# Patient Record
Sex: FEMALE | Race: WHITE | Employment: STUDENT
[De-identification: names, ages, dates, MRNs, and addresses within clinical notes are randomized per-mention and may not be internally consistent; named-entity substitution may affect disease eponyms.]

---

## 2022-11-07 SDOH — HEALTH STABILITY: PHYSICAL HEALTH: ON AVERAGE, HOW MANY MINUTES DO YOU ENGAGE IN EXERCISE AT THIS LEVEL?: 40 MIN

## 2022-11-07 SDOH — HEALTH STABILITY: PHYSICAL HEALTH: ON AVERAGE, HOW MANY DAYS PER WEEK DO YOU ENGAGE IN MODERATE TO STRENUOUS EXERCISE (LIKE A BRISK WALK)?: 5 DAYS

## 2022-11-08 ENCOUNTER — OFFICE VISIT (OUTPATIENT)
Dept: INTERNAL MEDICINE CLINIC | Facility: CLINIC | Age: 22
End: 2022-11-08
Payer: COMMERCIAL

## 2022-11-08 VITALS
DIASTOLIC BLOOD PRESSURE: 62 MMHG | OXYGEN SATURATION: 98 % | HEIGHT: 67 IN | BODY MASS INDEX: 22.6 KG/M2 | HEART RATE: 60 BPM | SYSTOLIC BLOOD PRESSURE: 110 MMHG | WEIGHT: 144 LBS

## 2022-11-08 DIAGNOSIS — R00.2 PALPITATION: Primary | ICD-10-CM

## 2022-11-08 DIAGNOSIS — R00.2 PALPITATION: ICD-10-CM

## 2022-11-08 DIAGNOSIS — T78.40XA ALLERGY, INITIAL ENCOUNTER: ICD-10-CM

## 2022-11-08 DIAGNOSIS — Z00.00 ENCOUNTER FOR MEDICAL EXAMINATION TO ESTABLISH CARE: ICD-10-CM

## 2022-11-08 DIAGNOSIS — Z91.018 MULTIPLE FOOD ALLERGIES: ICD-10-CM

## 2022-11-08 PROBLEM — E06.3 HASHIMOTO'S THYROIDITIS: Status: ACTIVE | Noted: 2021-07-01

## 2022-11-08 LAB
ANION GAP SERPL CALC-SCNC: 6 MMOL/L (ref 2–11)
BASOPHILS # BLD: 0 K/UL (ref 0–0.2)
BASOPHILS NFR BLD: 1 % (ref 0–2)
BUN SERPL-MCNC: 9 MG/DL (ref 6–23)
CALCIUM SERPL-MCNC: 9.4 MG/DL (ref 8.3–10.4)
CHLORIDE SERPL-SCNC: 109 MMOL/L (ref 101–110)
CO2 SERPL-SCNC: 26 MMOL/L (ref 21–32)
CREAT SERPL-MCNC: 0.6 MG/DL (ref 0.6–1)
DIFFERENTIAL METHOD BLD: NORMAL
EOSINOPHIL # BLD: 0.1 K/UL (ref 0–0.8)
EOSINOPHIL NFR BLD: 2 % (ref 0.5–7.8)
ERYTHROCYTE [DISTWIDTH] IN BLOOD BY AUTOMATED COUNT: 12.7 % (ref 11.9–14.6)
GLUCOSE SERPL-MCNC: 87 MG/DL (ref 65–100)
HCT VFR BLD AUTO: 40.5 % (ref 35.8–46.3)
HGB BLD-MCNC: 13.5 G/DL (ref 11.7–15.4)
IMM GRANULOCYTES # BLD AUTO: 0 K/UL (ref 0–0.5)
IMM GRANULOCYTES NFR BLD AUTO: 0 % (ref 0–5)
LYMPHOCYTES # BLD: 1.6 K/UL (ref 0.5–4.6)
LYMPHOCYTES NFR BLD: 27 % (ref 13–44)
MCH RBC QN AUTO: 28.4 PG (ref 26.1–32.9)
MCHC RBC AUTO-ENTMCNC: 33.3 G/DL (ref 31.4–35)
MCV RBC AUTO: 85.3 FL (ref 82–102)
MONOCYTES # BLD: 0.4 K/UL (ref 0.1–1.3)
MONOCYTES NFR BLD: 7 % (ref 4–12)
NEUTS SEG # BLD: 3.8 K/UL (ref 1.7–8.2)
NEUTS SEG NFR BLD: 63 % (ref 43–78)
NRBC # BLD: 0 K/UL (ref 0–0.2)
PLATELET # BLD AUTO: 205 K/UL (ref 150–450)
PMV BLD AUTO: 11.6 FL (ref 9.4–12.3)
POTASSIUM SERPL-SCNC: 3.5 MMOL/L (ref 3.5–5.1)
RBC # BLD AUTO: 4.75 M/UL (ref 4.05–5.2)
SODIUM SERPL-SCNC: 141 MMOL/L (ref 133–143)
WBC # BLD AUTO: 6 K/UL (ref 4.3–11.1)

## 2022-11-08 PROCEDURE — 99204 OFFICE O/P NEW MOD 45 MIN: CPT | Performed by: INTERNAL MEDICINE

## 2022-11-08 PROCEDURE — 93000 ELECTROCARDIOGRAM COMPLETE: CPT | Performed by: INTERNAL MEDICINE

## 2022-11-08 RX ORDER — EPINEPHRINE 0.3 MG/.3ML
0.3 INJECTION SUBCUTANEOUS ONCE
Qty: 0.3 ML | Refills: 0 | Status: SHIPPED | OUTPATIENT
Start: 2022-11-08 | End: 2022-11-22

## 2022-11-08 RX ORDER — KETOCONAZOLE 20 MG/ML
SHAMPOO TOPICAL
COMMUNITY
Start: 2022-11-02

## 2022-11-08 RX ORDER — LEVOTHYROXINE SODIUM 0.03 MG/1
TABLET ORAL
COMMUNITY
Start: 2022-10-17

## 2022-11-08 RX ORDER — EPINEPHRINE 0.3 MG/.3ML
0.3 INJECTION SUBCUTANEOUS ONCE
Status: DISCONTINUED | OUTPATIENT
Start: 2022-11-08 | End: 2022-11-08

## 2022-11-08 ASSESSMENT — ENCOUNTER SYMPTOMS
NAUSEA: 0
EYE ITCHING: 0
SINUS PRESSURE: 0
HOARSE VOICE: 0
SORE THROAT: 0
SHORTNESS OF BREATH: 0

## 2022-11-08 NOTE — PROGRESS NOTES
FOLLOW UP VISIT    Subjective:    Loreta Virk (: 2000) is a 20 y.o., female,   Chief Complaint   Patient presents with    Establish Care    Allergies     nuts    Other     Hep B vaccines    Palpitations       HPI:  20year old in to establish care. 1. Hypothyroid - She has a hx of thyroid d/o does go to ENDO had a recent TSH check at 10 was started in 25 mcg of synthroid. 2. Dandruff- on nizoril  3. Heart palpitations- does drink enough water, TSH high 10 , states comes and goes not a/w stress, caffiene   4. Multiple food allergies- has not had formal allergy test  5. Vaccine- needs hep b 3rd shot     Allergies  Presents for initial visit. She reports no congestion, eye itching, fever, headaches, hoarse voice, plugged ear sensation, sinus pressure or sore throat. Other  Pertinent negatives include no congestion, fever, headaches, nausea, numbness or sore throat. Palpitations   This is a recurrent problem. The current episode started more than 1 month ago. Pertinent negatives include no anxiety, chest fullness, fever, irregular heartbeat, nausea, numbness or shortness of breath. The following portions of the patient's history were reviewed and updated as appropriate:      No past medical history on file. Past Surgical History:   Procedure Laterality Date    WISDOM TOOTH EXTRACTION         No family history on file. Social History     Socioeconomic History    Marital status: Single     Spouse name: Not on file    Number of children: Not on file    Years of education: Not on file    Highest education level: Not on file   Occupational History    Not on file   Tobacco Use    Smoking status: Never    Smokeless tobacco: Never   Vaping Use    Vaping Use: Never used   Substance and Sexual Activity    Alcohol use: Yes     Comment: Occ.     Drug use: Not on file    Sexual activity: Not on file   Other Topics Concern    Not on file   Social History Narrative    Not on file     Social Determinants of Health     Financial Resource Strain: Not on file   Food Insecurity: Not on file   Transportation Needs: Not on file   Physical Activity: Sufficiently Active    Days of Exercise per Week: 5 days    Minutes of Exercise per Session: 40 min   Stress: Not on file   Social Connections: Not on file   Intimate Partner Violence: Not At Risk    Fear of Current or Ex-Partner: No    Emotionally Abused: No    Physically Abused: No    Sexually Abused: No   Housing Stability: Not on file       Current Outpatient Medications   Medication Sig Dispense Refill    Multiple Vitamin (MULTI-VITAMIN PO)       ketoconazole (NIZORAL) 2 % shampoo       levothyroxine (SYNTHROID) 25 MCG tablet        Current Facility-Administered Medications   Medication Dose Route Frequency Provider Last Rate Last Admin    EPINEPHrine (EPIPEN) 0.3 MG/0.3ML injection 0.3 mg  0.3 mg IntraMUSCular Once Lydia Palacios MD           Allergies as of 11/08/2022 - Fully Reviewed 11/08/2022   Allergen Reaction Noted    Other  11/08/2022    Pecan nut (diagnostic) Other (See Comments) 01/01/2022    Sesame seed (diagnostic) Cough 10/17/2022       Review of Systems   Constitutional:  Negative for fever. HENT:  Negative for congestion, hoarse voice, sinus pressure and sore throat. Eyes:  Negative for itching. Respiratory:  Negative for shortness of breath. Cardiovascular:  Positive for palpitations. Gastrointestinal:  Negative for nausea. Neurological:  Negative for numbness and headaches. Psychiatric/Behavioral:  The patient is not nervous/anxious. Objective:    Blood pressure 110/62, pulse 60, height 5' 7\" (1.702 m), weight 144 lb (65.3 kg), last menstrual period 11/07/2022, SpO2 98 %. Physical Exam  Vitals and nursing note reviewed. Constitutional:       Appearance: Normal appearance. Cardiovascular:      Rate and Rhythm: Normal rate and regular rhythm. Pulses: Normal pulses. Heart sounds: Normal heart sounds.    Pulmonary:      Effort: Pulmonary effort is normal.      Breath sounds: Normal breath sounds. Musculoskeletal:      Cervical back: Normal range of motion and neck supple. Neurological:      Mental Status: She is alert. No results found for this visit on 11/08/22. Assessent & Plan    1. Palpitation  -     EKG 12 Lead; Future  -     CBC with Auto Differential; Future  -     Basic Metabolic Panel; Future  2. Multiple food allergies  -     EPINEPHrine (EPIPEN) 0.3 MG/0.3ML injection 0.3 mg; 0.3 mg, IntraMUSCular, ONCE, 1 dose, On Tue 11/8/22 at 1330  -     AFL - Allergic Disease and [address] [facility] [place]  3. Allergy, initial encounter  4. Encounter for medical examination to establish care     Get old records  Set up for a CPE  EKG wnl - Cardiology ? PVC vs arrhythmia- holter? Go to ED if worse or sx   Allergies go to allergist get food tested  Advised to call  for Hep B vaccine locations    The patient and/or patient representative voiced understanding and agreement with the current diagnoses, recommendations, and possible side effects. No follow-up provider specified.       James Carrion MD

## 2022-11-22 ENCOUNTER — INITIAL CONSULT (OUTPATIENT)
Dept: CARDIOLOGY CLINIC | Age: 22
End: 2022-11-22
Payer: COMMERCIAL

## 2022-11-22 VITALS
BODY MASS INDEX: 22.78 KG/M2 | HEART RATE: 56 BPM | WEIGHT: 145.1 LBS | DIASTOLIC BLOOD PRESSURE: 70 MMHG | HEIGHT: 67 IN | SYSTOLIC BLOOD PRESSURE: 114 MMHG

## 2022-11-22 DIAGNOSIS — R00.2 PALPITATIONS: Primary | ICD-10-CM

## 2022-11-22 PROCEDURE — 99203 OFFICE O/P NEW LOW 30 MIN: CPT | Performed by: INTERNAL MEDICINE

## 2022-11-22 ASSESSMENT — ENCOUNTER SYMPTOMS
ALLERGIC/IMMUNOLOGIC NEGATIVE: 1
ABDOMINAL PAIN: 0
PHOTOPHOBIA: 0
GASTROINTESTINAL NEGATIVE: 1
BACK PAIN: 0
SHORTNESS OF BREATH: 0
EYE PAIN: 0
RESPIRATORY NEGATIVE: 1
EYES NEGATIVE: 1
CHEST TIGHTNESS: 0

## 2022-11-22 NOTE — PROGRESS NOTES
[address], [address] [facility], [address] [place] [address]  PHONE: [phone]    Loreta Virk  2000    SUBJECTIVE:   Loreta Virk is a 20 y.o. 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 She noticed that her palpitations began this . She describes them as a hard heartbeat or a missed heartbeat. She denies shortness of breath. She regularly plays soccer 3 times a week and has had no change in her exercise tolerance. She is hypothyroid and recently had some changes to her thyroid medication and feels the palpitations may be somewhat better since then. She is having symptoms approximately once every 5 days. EC22 - SR with RSR', rate variation no ischemia (Independent review/interpretation by me)      Past Medical History, Past Surgical History, Family history, Social History, and Medications were all reviewed with the patient today and updated as necessary. Current Outpatient Medications:     Multiple Vitamin (MULTI-VITAMIN PO), , Disp: , Rfl:     ketoconazole (NIZORAL) 2 % shampoo, , Disp: , Rfl:     levothyroxine (SYNTHROID) 25 MCG tablet, , Disp: , Rfl:     EPINEPHrine (EPIPEN 2-NATHALIA) 0.3 MG/0.3ML SOAJ injection, Inject 0.3 mLs into the muscle once for 1 dose Use as directed for allergic reaction, Disp: 0.3 mL, Rfl: 0  Allergies   Allergen Reactions    Other      Walnuts    Pecan Nut (Diagnostic) Other (See Comments)    Sesame Seed (Diagnostic) Cough     History reviewed. No pertinent past medical history. Past Surgical History:   Procedure Laterality Date    WISDOM TOOTH EXTRACTION       History reviewed.  No pertinent family history. Social History     Tobacco Use    Smoking status: Never    Smokeless tobacco: Never   Substance Use Topics    Alcohol use: Yes     Comment: Occ. ROS:  Review of Systems   Constitutional: Negative. Negative for fever. HENT:  Negative for hearing loss, nosebleeds and tinnitus. Eyes: Negative. Negative for photophobia and pain. Respiratory: Negative. Negative for chest tightness and shortness of breath. Cardiovascular:  Positive for palpitations. Negative for chest pain and leg swelling. Gastrointestinal: Negative. Negative for abdominal pain. Endocrine: Negative. Negative for cold intolerance and heat intolerance. Genitourinary: Negative. Negative for dysuria. Musculoskeletal: Negative. Negative for back pain and joint swelling. Skin: Negative. Negative for rash. Allergic/Immunologic: Negative. Negative for immunocompromised state. Neurological: Negative. Negative for dizziness, syncope and light-headedness. Hematological: Negative. Does not bruise/bleed easily. Psychiatric/Behavioral: Negative. Negative for suicidal ideas. PHYSICAL EXAM:  Physical Exam  Constitutional:       General: She is not in acute distress. Appearance: She is not ill-appearing. HENT:      Head: Normocephalic and atraumatic. Nose: No congestion. Mouth/Throat:      Mouth: Mucous membranes are moist.   Eyes:      Extraocular Movements: Extraocular movements intact. Pupils: Pupils are equal, round, and reactive to light. Cardiovascular:      Rate and Rhythm: Normal rate and regular rhythm. Heart sounds: No murmur heard. No friction rub. No gallop. Pulmonary:      Effort: No respiratory distress. Breath sounds: No wheezing or rhonchi. Musculoskeletal:         General: No swelling. Cervical back: Normal range of motion. Right lower leg: No edema. Left lower leg: No edema. Skin:     General: Skin is warm and dry. Findings: No rash. Neurological:      General: No focal deficit present. Mental Status: She is oriented to person, place, and time. Psychiatric:         Mood and Affect: Mood normal.         Behavior: Behavior normal.         Judgment: Judgment normal.        /70   Pulse 56   Ht 5' 7\" (1.702 m)   Wt 145 lb 1.6 oz (65.8 kg)   LMP 11/07/2022   BMI 22.73 kg/m²      Wt Readings from Last 10 Encounters:   11/22/22 145 lb 1.6 oz (65.8 kg)   11/08/22 144 lb (65.3 kg)           Medical problems and test results were reviewed with the patient today. Lab Results   Component Value Date/Time    BUN 9 11/08/2022 01:51 PM     No results found for: ROSI, CREAPOC, CREA  Lab Results   Component Value Date/Time    K 3.5 11/08/2022 01:51 PM       No results found for: CHOL, CHOLPOCT, CHOLX, CHLST, CHOLV, HDL, HDLPOC, HDLC, LDL, LDLC, VLDLC, VLDL, TGLX, TRIGL    ASSESSMENT and PLAN    ICD-10-CM    1. Palpitations  R00.2 Extended cardiac holter monitor (48 hrs - 15 days)     Transthoracic echocardiogram (TTE) complete with contrast, bubble, strain, and 3D PRN          IMPRESSION:  1) Palpitations -we will check an echocardiogram and a 14-day Zio patch. Likely benign PVCs. ORDERS  No orders of the defined types were placed in this encounter. Follow up in 1 months. Thank you for allowing me to participate in this patient's care. Please call or contact me if there are any questions or concerns regarding the above.       Sinan Olmedo MD  11/22/22  10:15 AM

## 2022-12-13 ENCOUNTER — TELEPHONE (OUTPATIENT)
Dept: CARDIOLOGY CLINIC | Age: 22
End: 2022-12-13

## 2022-12-13 NOTE — TELEPHONE ENCOUNTER
----- Message from Jaimie Morel MD sent at 12/13/2022 11:43 AM EST -----  Monitor was largely benign, will discuss findings at follow up.     Kan Salmon MD

## 2022-12-14 ENCOUNTER — OFFICE VISIT (OUTPATIENT)
Dept: INTERNAL MEDICINE CLINIC | Facility: CLINIC | Age: 22
End: 2022-12-14
Payer: COMMERCIAL

## 2022-12-14 VITALS
WEIGHT: 146 LBS | HEART RATE: 72 BPM | RESPIRATION RATE: 16 BRPM | HEIGHT: 67 IN | BODY MASS INDEX: 22.91 KG/M2 | SYSTOLIC BLOOD PRESSURE: 120 MMHG | DIASTOLIC BLOOD PRESSURE: 80 MMHG | OXYGEN SATURATION: 99 % | TEMPERATURE: 98.2 F

## 2022-12-14 DIAGNOSIS — Z00.00 ANNUAL PHYSICAL EXAM: Primary | ICD-10-CM

## 2022-12-14 PROCEDURE — 99395 PREV VISIT EST AGE 18-39: CPT | Performed by: INTERNAL MEDICINE

## 2022-12-14 ASSESSMENT — ENCOUNTER SYMPTOMS
EYE REDNESS: 0
COUGH: 0
RECTAL PAIN: 0
WHEEZING: 0
SHORTNESS OF BREATH: 0
ALLERGIC/IMMUNOLOGIC NEGATIVE: 1
ABDOMINAL PAIN: 0
EYE DISCHARGE: 0
NAUSEA: 0
RHINORRHEA: 0
EYE ITCHING: 0
EYES NEGATIVE: 1
CHEST TIGHTNESS: 0
CHOKING: 0
BACK PAIN: 0
SINUS PRESSURE: 0
CONSTIPATION: 0
EYE PAIN: 0
COLOR CHANGE: 0
ABDOMINAL DISTENTION: 0
RESPIRATORY NEGATIVE: 1
GASTROINTESTINAL NEGATIVE: 1
DIARRHEA: 0
BLOOD IN STOOL: 0
ANAL BLEEDING: 0
STRIDOR: 0

## 2022-12-14 ASSESSMENT — PATIENT HEALTH QUESTIONNAIRE - PHQ9
2. FEELING DOWN, DEPRESSED OR HOPELESS: 0
SUM OF ALL RESPONSES TO PHQ9 QUESTIONS 1 & 2: 0
SUM OF ALL RESPONSES TO PHQ QUESTIONS 1-9: 0
1. LITTLE INTEREST OR PLEASURE IN DOING THINGS: 0
SUM OF ALL RESPONSES TO PHQ QUESTIONS 1-9: 0

## 2022-12-14 NOTE — PROGRESS NOTES
FOLLOW UP VISIT    Subjective:    Homar Watkins (: 2000) is a 20 y.o., female,   Chief Complaint   Patient presents with    Annual Exam     Pt is not fasting and would like to do labs on Friday        HPI:  20year old in for cpe         The following portions of the patient's history were reviewed and updated as appropriate:      History reviewed. No pertinent past medical history. Past Surgical History:   Procedure Laterality Date    WISDOM TOOTH EXTRACTION         History reviewed. No pertinent family history. Social History     Socioeconomic History    Marital status: Single     Spouse name: Not on file    Number of children: Not on file    Years of education: Not on file    Highest education level: Not on file   Occupational History    Not on file   Tobacco Use    Smoking status: Never    Smokeless tobacco: Never   Vaping Use    Vaping Use: Never used   Substance and Sexual Activity    Alcohol use: Yes     Comment: Occ.     Drug use: Not on file    Sexual activity: Not on file   Other Topics Concern    Not on file   Social History Narrative    Not on file     Social Determinants of Health     Financial Resource Strain: Not on file   Food Insecurity: Not on file   Transportation Needs: Not on file   Physical Activity: Sufficiently Active    Days of Exercise per Week: 5 days    Minutes of Exercise per Session: 40 min   Stress: Not on file   Social Connections: Not on file   Intimate Partner Violence: Not At Risk    Fear of Current or Ex-Partner: No    Emotionally Abused: No    Physically Abused: No    Sexually Abused: No   Housing Stability: Not on file       Current Outpatient Medications   Medication Sig Dispense Refill    Multiple Vitamin (MULTI-VITAMIN PO)       ketoconazole (NIZORAL) 2 % shampoo       levothyroxine (SYNTHROID) 25 MCG tablet       EPINEPHrine (EPIPEN 2-NATHALIA) 0.3 MG/0.3ML SOAJ injection Inject 0.3 mLs into the muscle once for 1 dose Use as directed for allergic reaction 0.3 mL 0 No current facility-administered medications for this visit. Allergies as of 12/14/2022 - Fully Reviewed 12/14/2022   Allergen Reaction Noted    Other  11/08/2022    Pecan nut (diagnostic) Other (See Comments) 01/01/2022    Sesame seed (diagnostic) Cough 10/17/2022       Review of Systems   Constitutional: Negative. Negative for activity change, appetite change, chills, diaphoresis and fatigue. HENT: Negative. Negative for congestion, hearing loss, postnasal drip, rhinorrhea and sinus pressure. Eyes: Negative. Negative for pain, discharge, redness and itching. Respiratory: Negative. Negative for cough, choking, chest tightness, shortness of breath, wheezing and stridor. Cardiovascular: Negative. Negative for chest pain, palpitations and leg swelling. Gastrointestinal: Negative. Negative for abdominal distention, abdominal pain, anal bleeding, blood in stool, constipation, diarrhea, nausea and rectal pain. Endocrine: Negative. Negative for cold intolerance, heat intolerance, polydipsia, polyphagia and polyuria. Genitourinary: Negative. Negative for decreased urine volume, difficulty urinating, dysuria, enuresis, flank pain, frequency, genital sores, hematuria and urgency. Musculoskeletal: Negative. Negative for arthralgias, back pain, gait problem, joint swelling, myalgias, neck pain and neck stiffness. Skin: Negative. Negative for color change, pallor, rash and wound. Allergic/Immunologic: Negative. Negative for environmental allergies, food allergies and immunocompromised state. Neurological: Negative. Negative for dizziness, tremors, seizures, syncope, facial asymmetry, light-headedness, numbness and headaches. Hematological: Negative. Psychiatric/Behavioral:  Negative for agitation, behavioral problems, confusion, decreased concentration, dysphoric mood, hallucinations, sleep disturbance and suicidal ideas. The patient is not nervous/anxious.     All other systems reviewed and are negative. Objective:    Blood pressure 120/80, pulse 72, temperature 98.2 °F (36.8 °C), temperature source Temporal, resp. rate 16, height 5' 7\" (1.702 m), weight 146 lb (66.2 kg), SpO2 99 %. Physical Exam  Vitals and nursing note reviewed. Constitutional:       General: She is not in acute distress. Appearance: Normal appearance. She is not ill-appearing or toxic-appearing. HENT:      Head: Normocephalic and atraumatic. Right Ear: Tympanic membrane, ear canal and external ear normal.      Left Ear: Tympanic membrane, ear canal and external ear normal.   Eyes:      General: No scleral icterus. Right eye: No discharge. Left eye: No discharge. Extraocular Movements: Extraocular movements intact. Conjunctiva/sclera: Conjunctivae normal.      Pupils: Pupils are equal, round, and reactive to light. Cardiovascular:      Rate and Rhythm: Normal rate and regular rhythm. Pulses: Normal pulses. Heart sounds: Normal heart sounds. No murmur heard. No friction rub. No gallop. Pulmonary:      Effort: Pulmonary effort is normal. No respiratory distress. Breath sounds: Normal breath sounds. No stridor. No wheezing, rhonchi or rales. Chest:      Chest wall: No tenderness. Abdominal:      General: Abdomen is flat. Bowel sounds are normal. There is no distension. Palpations: There is no mass. Tenderness: There is no abdominal tenderness. There is no guarding or rebound. Hernia: No hernia is present. Musculoskeletal:         General: No swelling, tenderness, deformity or signs of injury. Cervical back: Normal range of motion and neck supple. No rigidity or tenderness. Lymphadenopathy:      Cervical: No cervical adenopathy. Skin:     General: Skin is warm and dry. Neurological:      General: No focal deficit present. Mental Status: She is alert. Cranial Nerves: No cranial nerve deficit.       Sensory: No sensory deficit. Motor: No weakness. Coordination: Coordination normal.   Psychiatric:         Mood and Affect: Mood normal.         Behavior: Behavior normal.       No results found for this visit on 12/14/22. Assessent & Plan  20year old in for CPE  Pap 2022   Check labs  Counseling/Anticipatory Guidance:  nutrition, family planning/contraception, physical activity, healthy weight, injury prevention, misuse of tobacco, alcohol and drugs, sexual behavior and STDs, dental health, mental health, immunizations, screenings   Breast cancer and self breast exams      The patient and/or patient representative voiced understanding and agreement with the current diagnoses, recommendations, and possible side effects. No follow-up provider specified.       Jhon Farrell MD

## 2022-12-19 DIAGNOSIS — Z00.00 ANNUAL PHYSICAL EXAM: ICD-10-CM

## 2022-12-19 LAB
ALBUMIN SERPL-MCNC: 4.2 G/DL (ref 3.5–5)
ALBUMIN/GLOB SERPL: 1.5 (ref 0.4–1.6)
ALP SERPL-CCNC: 74 U/L (ref 50–136)
ALT SERPL-CCNC: 53 U/L (ref 12–65)
ANION GAP SERPL CALC-SCNC: 8 MMOL/L (ref 2–11)
APPEARANCE UR: CLEAR
AST SERPL-CCNC: 41 U/L (ref 15–37)
BACTERIA URNS QL MICRO: 0 /HPF
BILIRUB SERPL-MCNC: 0.7 MG/DL (ref 0.2–1.1)
BILIRUB UR QL: NEGATIVE
BUN SERPL-MCNC: 9 MG/DL (ref 6–23)
CALCIUM SERPL-MCNC: 9.3 MG/DL (ref 8.3–10.4)
CASTS URNS QL MICRO: 0 /LPF
CHLORIDE SERPL-SCNC: 107 MMOL/L (ref 101–110)
CHOLEST SERPL-MCNC: 188 MG/DL
CO2 SERPL-SCNC: 25 MMOL/L (ref 21–32)
COLOR UR: NORMAL
CREAT SERPL-MCNC: 0.7 MG/DL (ref 0.6–1)
CRYSTALS URNS QL MICRO: 0 /LPF
EPI CELLS #/AREA URNS HPF: 0 /HPF
GLOBULIN SER CALC-MCNC: 2.8 G/DL (ref 2.8–4.5)
GLUCOSE SERPL-MCNC: 90 MG/DL (ref 65–100)
GLUCOSE UR STRIP.AUTO-MCNC: NEGATIVE MG/DL
HDLC SERPL-MCNC: 50 MG/DL (ref 40–60)
HDLC SERPL: 3.8
HGB UR QL STRIP: NEGATIVE
KETONES UR QL STRIP.AUTO: NEGATIVE MG/DL
LDLC SERPL CALC-MCNC: 124.2 MG/DL
LEUKOCYTE ESTERASE UR QL STRIP.AUTO: NEGATIVE
MUCOUS THREADS URNS QL MICRO: 0 /LPF
NITRITE UR QL STRIP.AUTO: NEGATIVE
PH UR STRIP: 7 (ref 5–9)
POTASSIUM SERPL-SCNC: 3.9 MMOL/L (ref 3.5–5.1)
PROT SERPL-MCNC: 7 G/DL (ref 6.3–8.2)
PROT UR STRIP-MCNC: NEGATIVE MG/DL
RBC #/AREA URNS HPF: 0 /HPF
SODIUM SERPL-SCNC: 140 MMOL/L (ref 133–143)
SP GR UR REFRACTOMETRY: <1.005 (ref 1–1.02)
TRIGL SERPL-MCNC: 69 MG/DL (ref 35–150)
URINE CULTURE IF INDICATED: NORMAL
UROBILINOGEN UR QL STRIP.AUTO: 0.2 EU/DL (ref 0.2–1)
VLDLC SERPL CALC-MCNC: 13.8 MG/DL (ref 6–23)
WBC URNS QL MICRO: 0 /HPF

## 2023-01-04 ENCOUNTER — OFFICE VISIT (OUTPATIENT)
Dept: CARDIOLOGY CLINIC | Age: 23
End: 2023-01-04
Payer: COMMERCIAL

## 2023-01-04 VITALS
DIASTOLIC BLOOD PRESSURE: 70 MMHG | HEIGHT: 67 IN | BODY MASS INDEX: 22.47 KG/M2 | HEART RATE: 68 BPM | WEIGHT: 143.2 LBS | SYSTOLIC BLOOD PRESSURE: 110 MMHG

## 2023-01-04 DIAGNOSIS — I49.3 PVC'S (PREMATURE VENTRICULAR CONTRACTIONS): ICD-10-CM

## 2023-01-04 PROCEDURE — 99202 OFFICE O/P NEW SF 15 MIN: CPT | Performed by: INTERNAL MEDICINE

## 2023-01-04 ASSESSMENT — ENCOUNTER SYMPTOMS
PHOTOPHOBIA: 0
BACK PAIN: 0
EYE PAIN: 0
ALLERGIC/IMMUNOLOGIC NEGATIVE: 1
SHORTNESS OF BREATH: 0
RESPIRATORY NEGATIVE: 1
GASTROINTESTINAL NEGATIVE: 1
EYES NEGATIVE: 1
CHEST TIGHTNESS: 0
ABDOMINAL PAIN: 0

## 2023-01-04 NOTE — PROGRESS NOTES
[facility] CARDIOLOGY  [address], [address]  PHONE: [phone]      23    NAME:  Larisa Huerta  : 2000  MRN: [id number]         SUBJECTIVE:   Larisa Huerta is a 20 y.o. female seen for follow up of:      No chief complaint on file. 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  Continues to have symptoms. Past Medical History, Past Surgical History, Family history, Social History, and Medications were all reviewed with the patient today and updated as necessary. Current Outpatient Medications:     Multiple Vitamin (MULTI-VITAMIN PO), , Disp: , Rfl:     ketoconazole (NIZORAL) 2 % shampoo, , Disp: , Rfl:     levothyroxine (SYNTHROID) 25 MCG tablet, , Disp: , Rfl:     EPINEPHrine (EPIPEN 2-NATHALIA) 0.3 MG/0.3ML SOAJ injection, Inject 0.3 mLs into the muscle once for 1 dose Use as directed for allergic reaction, Disp: 0.3 mL, Rfl: 0  Allergies   Allergen Reactions    Other      Walnuts    Pecan Nut (Diagnostic) Other (See Comments)    Sesame Seed (Diagnostic) Cough     No past medical history on file. Past Surgical History:   Procedure Laterality Date    WISDOM TOOTH EXTRACTION       No family history on file. Social History     Tobacco Use    Smoking status: Never    Smokeless tobacco: Never   Substance Use Topics    Alcohol use: Yes     Comment: Occ. ROS:  Review of Systems   Constitutional: Negative. Negative for fever. HENT:  Negative for hearing loss, nosebleeds and tinnitus. Eyes: Negative. Negative for photophobia and pain. Respiratory: Negative. Negative for chest tightness and shortness of breath. Cardiovascular: Negative. Negative for chest pain, palpitations and leg swelling. Gastrointestinal: Negative. Negative for abdominal pain. Endocrine: Negative. Negative for cold intolerance and heat intolerance. Genitourinary: Negative. Negative for dysuria. Musculoskeletal: Negative. Negative for back pain and joint swelling. Skin: Negative. Negative for rash. Allergic/Immunologic: Negative. Negative for immunocompromised state. Neurological: Negative. Negative for dizziness, syncope and light-headedness. Hematological: Negative. Does not bruise/bleed easily. Psychiatric/Behavioral: Negative. Negative for suicidal ideas. PHYSICAL EXAM:  Physical Exam  Constitutional:       General: She is not in acute distress. Appearance: She is not ill-appearing. HENT:      Head: Normocephalic and atraumatic. Nose: No congestion. Mouth/Throat:      Mouth: Mucous membranes are moist.   Eyes:      Extraocular Movements: Extraocular movements intact. Pupils: Pupils are equal, round, and reactive to light. Cardiovascular:      Rate and Rhythm: Normal rate and regular rhythm. Heart sounds: No murmur heard. No friction rub. No gallop. Pulmonary:      Effort: No respiratory distress. Breath sounds: No wheezing or rhonchi. Musculoskeletal:         General: No swelling. Cervical back: Normal range of motion. Right lower leg: No edema. Left lower leg: No edema. Skin:     General: Skin is warm and dry. Findings: No rash. Neurological:      General: No focal deficit present. Mental Status: She is oriented to person, place, and time.    Psychiatric:         Mood and Affect: Mood normal.         Behavior: Behavior normal.         Judgment: Judgment normal.        Ht 5' 7\" (1.702 m)   Wt 143 lb 3.2 oz (65 kg)   BMI 22.43 kg/m²      Wt Readings from Last 10 Encounters:   01/04/23 143 lb 3.2 oz (65 kg)   12/14/22 146 lb (66.2 kg)   12/07/22 145 lb (65.8 kg)   11/22/22 145 lb 1.6 oz (65.8 kg)   11/08/22 144 lb (65.3 kg)           Medical problems and test results were reviewed with the patient today. Lab Results   Component Value Date/Time    BUN 9 12/19/2022 08:40 AM     No results found for: ROSI, CREAPOC, CREA  Lab Results   Component Value Date/Time    K 3.9 12/19/2022 08:40 AM       Lab Results   Component Value Date/Time    CHOL 188 12/19/2022 08:40 AM    HDL 50 12/19/2022 08:40 AM       ASSESSMENT and PLAN  No diagnosis found. IMPRESSION:  1) Palpitations - benign      ALL ORDERS THIS ENCOUNTER  No orders of the defined types were placed in this encounter. Follow up as needed. Thank you for allowing me to participate in this patient's care. Please call or contact me if there are any questions or concerns regarding the above.       Kim Haynes MD  01/04/23  2:51 PM

## 2023-04-14 ENCOUNTER — PATIENT MESSAGE (OUTPATIENT)
Dept: INTERNAL MEDICINE CLINIC | Facility: CLINIC | Age: 23
End: 2023-04-14

## 2023-04-14 DIAGNOSIS — Z11.59 NEED FOR HEPATITIS B SCREENING TEST: Primary | ICD-10-CM

## 2023-04-18 ENCOUNTER — TELEMEDICINE (OUTPATIENT)
Dept: INTERNAL MEDICINE CLINIC | Facility: CLINIC | Age: 23
End: 2023-04-18
Payer: COMMERCIAL

## 2023-04-18 DIAGNOSIS — Z23 NEED FOR DTAP, HIB, AND HBV VACCINE: Primary | ICD-10-CM

## 2023-04-18 DIAGNOSIS — I49.3 PVC'S (PREMATURE VENTRICULAR CONTRACTIONS): ICD-10-CM

## 2023-04-18 PROCEDURE — 99213 OFFICE O/P EST LOW 20 MIN: CPT | Performed by: INTERNAL MEDICINE

## 2023-04-18 NOTE — PROGRESS NOTES
Use as directed for allergic reaction 0.3 mL 0     No current facility-administered medications for this visit. Allergies as of 04/18/2023 - Fully Reviewed 01/04/2023   Allergen Reaction Noted    Other  11/08/2022    Pecan nut (diagnostic) Other (See Comments) 01/01/2022    Sesame seed (diagnostic) Cough 10/17/2022       Review of Systems    Objective: There were no vitals taken for this visit. Physical Exam    No results found for this visit on 04/18/23. Assessent & Plan    1. Need for DTaP, Hib, and HBV vaccine- check HbsAb  2. PVC's (premature ventricular contractions)  Benign   Follow up as needed      Smitha Norris was evaluated through a synchronous (real-time) audio-video encounter, and/or her healthcare decision maker, is aware that it is a billable service, which includes applicable co-pays, with coverage as determined by her insurance carrier. She provided verbal consent to proceed and patient identification was verified. This visit was conducted pursuant to the emergency declaration under the [address] [place] [address] [facility] authority and the [employer] Act. A caregiver was present when appropriate. Ability to conduct physical exam was limited. The patient was located at home in a state where the provider was licensed to provide care. --Usman Calderón MD on 4/18/2023 at 2:49 PM    An electronic signature was used to authenticate this note. The patient and/or patient representative voiced understanding and agreement with the current diagnoses, recommendations, and possible side effects. No follow-up provider specified.       Usman Calderón MD

## 2023-05-01 DIAGNOSIS — Z23 NEED FOR DTAP, HIB, AND HBV VACCINE: ICD-10-CM

## 2023-05-02 LAB — HBV SURFACE AB SERPL IA-ACNC: >1000 MIU/ML

## 2023-06-14 DIAGNOSIS — R79.89 ELEVATED LFTS: ICD-10-CM

## 2023-06-14 LAB
ALBUMIN SERPL-MCNC: 4.2 G/DL (ref 3.5–5)
ALBUMIN/GLOB SERPL: 1.3 (ref 0.4–1.6)
ALP SERPL-CCNC: 57 U/L (ref 50–136)
ALT SERPL-CCNC: 27 U/L (ref 12–65)
AST SERPL-CCNC: 21 U/L (ref 15–37)
BILIRUB DIRECT SERPL-MCNC: <0.1 MG/DL
BILIRUB SERPL-MCNC: 0.3 MG/DL (ref 0.2–1.1)
GLOBULIN SER CALC-MCNC: 3.3 G/DL (ref 2.8–4.5)
PROT SERPL-MCNC: 7.5 G/DL (ref 6.3–8.2)

## 2023-06-28 DIAGNOSIS — K92.9 GASTROINTESTINAL DISORDER: ICD-10-CM

## 2023-06-28 DIAGNOSIS — R14.2 BELCHING: Primary | ICD-10-CM

## 2023-07-06 ENCOUNTER — PATIENT MESSAGE (OUTPATIENT)
Dept: INTERNAL MEDICINE CLINIC | Facility: CLINIC | Age: 23
End: 2023-07-06

## 2023-07-06 DIAGNOSIS — Z12.4 SCREENING FOR CERVICAL CANCER: Primary | ICD-10-CM

## 2023-07-06 NOTE — TELEPHONE ENCOUNTER
From: Nico Santana  To: Dr. Radha Fonseca: 7/6/2023 9:14 AM EDT  Subject: OBGYN Referral    Hi Dr. Iwona Gerber,    We talked last appointment about a referral to OBGYN Dr. Isabell Steiner. They never called me, and I tried calling their office and was not able to reach them. I wanted to check to see if you sent the referral. Thank you!

## 2023-09-07 ENCOUNTER — OFFICE VISIT (OUTPATIENT)
Dept: GASTROENTEROLOGY | Age: 23
End: 2023-09-07
Payer: COMMERCIAL

## 2023-09-07 VITALS
BODY MASS INDEX: 22.81 KG/M2 | OXYGEN SATURATION: 100 % | RESPIRATION RATE: 18 BRPM | WEIGHT: 150 LBS | DIASTOLIC BLOOD PRESSURE: 72 MMHG | TEMPERATURE: 100 F | HEART RATE: 64 BPM | SYSTOLIC BLOOD PRESSURE: 108 MMHG

## 2023-09-07 DIAGNOSIS — R19.5 LOOSE STOOLS: ICD-10-CM

## 2023-09-07 DIAGNOSIS — R14.2 FUNCTIONAL BURPING DISORDER: Primary | ICD-10-CM

## 2023-09-07 DIAGNOSIS — Z83.79 FAMILY HISTORY OF CELIAC DISEASE: ICD-10-CM

## 2023-09-07 PROCEDURE — 99204 OFFICE O/P NEW MOD 45 MIN: CPT | Performed by: PHYSICIAN ASSISTANT

## 2023-09-07 RX ORDER — PANTOPRAZOLE SODIUM 40 MG/1
40 TABLET, DELAYED RELEASE ORAL DAILY
Qty: 90 TABLET | Refills: 0 | Status: SHIPPED | OUTPATIENT
Start: 2023-09-07 | End: 2023-12-06

## 2023-09-07 NOTE — PROGRESS NOTES
Smitha Roberson (:  2000) is a 24 y.o. female new patient referred to our office for evaluation of the following chief complaint(s): Other (Acid reflux symptoms,)           ASSESSMENT/PLAN:  1. Functional burping disorder  -     pantoprazole (PROTONIX) 40 MG tablet; Take 1 tablet by mouth daily, Disp-90 tablet, R-0Normal  -     H. Pylori Antigen, Stool; Future  2. Family history of celiac disease  3. Loose stools       Counseled patient and provided written recommendations for diet and lifestyle modifications for GERD. Avoid tobacco, alcohol, NSAIDs. I offered EGD which she declines at this point in favor of more conservative approach. She is not sure she wants any celiac testing - lengthy discussion around this and will hold off for now. Start Protonix 40 mg QD after she submits stool sample for H.pylori testing. Follow-up 1 month; if no improvement would recommend EGD evaluation. We also discussed ongoing loose stools; has had no prior work-up. Discussed consideration for IBD lab and stool testing which was overwhelming and she does not want to pursue at this time. Gene Lloyd is a 24y.o. year old female with PMH is pertinent for Hashimoto's thyroiditis. Patient was referred to our office for evaluation of belching. Labs reviewed; TSH rising on 8/15 to 6.880. There is no recent abdominal imaging to review. Today patient a 6-7 month history of issues not being able to belch. She tries to wiggle it out but this is difficult and then is associated with some burning in the epigastrium and into the throat and the mouth. She gets embarrassed by the squeaking noise the belch makes. Sometimes pain radiates across the upper abdomen. Denies dysphagia or odynophagia. Sometimes water \"goes down the wrong\" which causes some coughing. She was evaluated by [facility] a few years ago for IBS symptoms and was recommended to start low-FODMAP diet which she admits she did not follow.  No

## 2023-09-07 NOTE — PROGRESS NOTES
She reports a 6-7 month history of issues not being able to belch. She tries to wiggle it out but this is difficult and then is associated with some burning in the epigastrium and into the throat and the mouth. Sometimes pain radiates across the upper abdomen. Denies dysphagia or odynophagia. Sometimes water \"goes down the wrong\" which causes some coughing. She was evaluated by [facility] a few years ago for IBS symptoms and was recommended to start low-FODMAP diet which she admits she did not follow. Previously had loose stools in the morning but reports this has improved. Denies constipation, melena, or hematochezia. Denies prior EGD or colonoscopy. TRENT had pancreatic cancer, diagnosed at approximately 78. Both her brother and sister have celiac disorder. She believes she had a blood test when she was little and this was reportedly negative. Her sister was negative initially as well but also recently tested positive. She denies tobacco. She drinks alcohol rarely. Denies illicit substance. Denies [address] She takes Aleve occasionally for menstrual cramping.

## 2023-10-08 ENCOUNTER — PATIENT MESSAGE (OUTPATIENT)
Dept: GASTROENTEROLOGY | Age: 23
End: 2023-10-08

## 2023-10-09 ENCOUNTER — TELEPHONE (OUTPATIENT)
Dept: GASTROENTEROLOGY | Age: 23
End: 2023-10-09

## 2023-10-09 NOTE — TELEPHONE ENCOUNTER
The breath test order appears to be active still. It's okay for her to use this test if it's easier for her to get it done.      Demetrio Brown PA-C

## 2023-10-09 NOTE — TELEPHONE ENCOUNTER
L/M for patient let her know the breath test ordered previously on 6- by Pacheco Leyva  is still valid. You may go to the lab at anytime no appointment is necessary  they are open  7:30 am to 4:30 pm  M-F  If you have any questions please give our office a call .

## 2023-10-09 NOTE — TELEPHONE ENCOUNTER
From: Mira Lanza  To: Tip Glover  Sent: 10/8/2023 2:18 PM EDT  Subject: H. pylori test    Hi, I apologize to ask this, but I haven't been able to get the H. pylori stool test done. With my schedule I don't see myself being able to do it before my next appointment. I could probably get in to the lab to do the breath test instead. I know I had an order for it from Dr. Des Amezquita - could we see if it is still valid and if not order a new one? Thank you for your help.

## 2023-10-10 DIAGNOSIS — K21.9 GASTROESOPHAGEAL REFLUX DISEASE WITHOUT ESOPHAGITIS: ICD-10-CM

## 2023-10-10 DIAGNOSIS — R14.2 BELCHING SYMPTOM: ICD-10-CM

## 2023-10-10 NOTE — PROGRESS NOTES
HPI:  Ms. Margret Dodge is a 24 y.o.   OB History          0    Para   0    Term   0       0    AB   0    Living   0         SAB   0    IAB   0    Ectopic   0    Molar   0    Multiple   0    Live Births   0             who is here today for a New Patient referral from Dr Taj Vergara for well woman exam and pap smear. She would like to discuss painful cycles. Pt seeing [facility] for acid reflux workup. Was on birth control for years- pain well controlled with alleve  Stopped birth control but is sexually active   Date Performed Result   PAP 2022 [facility] per pt Normal per pt   Mammogram NA    Colonoscopy NA    Dexa NA            GYN History           Patient's last menstrual period was 10/08/2023 (exact date). Cycle Length 28 Lasting 5  positive dysmenorrhea; negative postcoital bleeding    Past Medical History:  Past Medical History:   Diagnosis Date    Hypothyroidism 2021    Irritable bowel syndrome 10/2021    Saw a GI doctor for chronic loose stools and she said I probably have IBS       Past Surgical History:  Past Surgical History:   Procedure Laterality Date    WISDOM TOOTH EXTRACTION         Allergies:    Allergies   Allergen Reactions    Other      Walnuts    Pecan Nut (Diagnostic) Other (See Comments)    Sesame Seed (Diagnostic)      Pt reports no longer allergic         Medication History:  Current Outpatient Medications   Medication Sig Dispense Refill    pantoprazole (PROTONIX) 40 MG tablet Take 1 tablet by mouth daily 90 tablet 0    Multiple Vitamin (MULTI-VITAMIN PO)       ketoconazole (NIZORAL) 2 % shampoo       levothyroxine (SYNTHROID) 25 MCG tablet Take 2 tablets by mouth Daily      omeprazole (PRILOSEC) 40 MG delayed release capsule Take 1 capsule by mouth every morning (before breakfast) (Patient not taking: Reported on 2023) 14 capsule 0    EPINEPHrine (EPIPEN 2-NATHALIA) 0.3 MG/0.3ML SOAJ injection Inject 0.3 mLs into the muscle once for 1 dose Use as directed for allergic

## 2023-10-11 ENCOUNTER — OFFICE VISIT (OUTPATIENT)
Dept: OBGYN CLINIC | Age: 23
End: 2023-10-11
Payer: COMMERCIAL

## 2023-10-11 VITALS
DIASTOLIC BLOOD PRESSURE: 70 MMHG | HEIGHT: 68 IN | SYSTOLIC BLOOD PRESSURE: 120 MMHG | WEIGHT: 150.8 LBS | BODY MASS INDEX: 22.85 KG/M2

## 2023-10-11 DIAGNOSIS — Z01.419 WELL WOMAN EXAM: Primary | ICD-10-CM

## 2023-10-11 DIAGNOSIS — Z11.3 SCREENING EXAMINATION FOR STD (SEXUALLY TRANSMITTED DISEASE): ICD-10-CM

## 2023-10-11 DIAGNOSIS — Z13.89 SCREENING FOR GENITOURINARY CONDITION: ICD-10-CM

## 2023-10-11 DIAGNOSIS — Z12.4 SCREENING FOR CERVICAL CANCER: ICD-10-CM

## 2023-10-11 LAB
BILIRUBIN, URINE, POC: NEGATIVE
BLOOD URINE, POC: NEGATIVE
GLUCOSE URINE, POC: NEGATIVE
KETONES, URINE, POC: NEGATIVE
LEUKOCYTE ESTERASE, URINE, POC: NEGATIVE
NITRITE, URINE, POC: NEGATIVE
PH, URINE, POC: 7 (ref 4.6–8)
PROTEIN,URINE, POC: NEGATIVE
SPECIFIC GRAVITY, URINE, POC: 1.02 (ref 1–1.03)
UREA BREATH TEST QL: NEGATIVE
URINALYSIS CLARITY, POC: CLEAR
URINALYSIS COLOR, POC: YELLOW
UROBILINOGEN, POC: NORMAL

## 2023-10-11 PROCEDURE — 99385 PREV VISIT NEW AGE 18-39: CPT | Performed by: OBSTETRICS & GYNECOLOGY

## 2023-10-11 PROCEDURE — 81002 URINALYSIS NONAUTO W/O SCOPE: CPT | Performed by: OBSTETRICS & GYNECOLOGY

## 2023-10-16 LAB
C TRACH RRNA CVX QL NAA+PROBE: NEGATIVE
CYTOLOGIST CVX/VAG CYTO: NORMAL
CYTOLOGY CVX/VAG DOC THIN PREP: NORMAL
HPV REFLEX: NORMAL
Lab: NORMAL
N GONORRHOEA RRNA CVX QL NAA+PROBE: NEGATIVE
PATH REPORT.FINAL DX SPEC: NORMAL
STAT OF ADQ CVX/VAG CYTO-IMP: NORMAL
T VAGINALIS RRNA SPEC QL NAA+PROBE: NEGATIVE

## 2023-12-11 ENCOUNTER — OFFICE VISIT (OUTPATIENT)
Dept: PRIMARY CARE CLINIC | Facility: CLINIC | Age: 23
End: 2023-12-11
Payer: COMMERCIAL

## 2023-12-11 ENCOUNTER — PATIENT MESSAGE (OUTPATIENT)
Dept: PRIMARY CARE CLINIC | Facility: CLINIC | Age: 23
End: 2023-12-11

## 2023-12-11 VITALS
HEIGHT: 68 IN | DIASTOLIC BLOOD PRESSURE: 66 MMHG | OXYGEN SATURATION: 98 % | HEART RATE: 70 BPM | RESPIRATION RATE: 17 BRPM | BODY MASS INDEX: 23.04 KG/M2 | SYSTOLIC BLOOD PRESSURE: 118 MMHG | WEIGHT: 152 LBS | TEMPERATURE: 97.4 F

## 2023-12-11 DIAGNOSIS — K58.8 OTHER IRRITABLE BOWEL SYNDROME: ICD-10-CM

## 2023-12-11 DIAGNOSIS — Z13.89 SCREENING FOR SKIN CONDITION: ICD-10-CM

## 2023-12-11 DIAGNOSIS — R22.1 MASS PRESENT ON ONE SIDE OF NECK: ICD-10-CM

## 2023-12-11 DIAGNOSIS — I49.3 PVC'S (PREMATURE VENTRICULAR CONTRACTIONS): Primary | ICD-10-CM

## 2023-12-11 DIAGNOSIS — E06.3 HASHIMOTO'S THYROIDITIS: ICD-10-CM

## 2023-12-11 LAB
BASOPHILS # BLD: 0.1 K/UL (ref 0–0.2)
BASOPHILS NFR BLD: 1 % (ref 0–2)
DIFFERENTIAL METHOD BLD: NORMAL
EOSINOPHIL # BLD: 0.1 K/UL (ref 0–0.8)
EOSINOPHIL NFR BLD: 2 % (ref 0.5–7.8)
ERYTHROCYTE [DISTWIDTH] IN BLOOD BY AUTOMATED COUNT: 12.8 % (ref 11.9–14.6)
HCT VFR BLD AUTO: 42.9 % (ref 35.8–46.3)
HGB BLD-MCNC: 14 G/DL (ref 11.7–15.4)
IMM GRANULOCYTES # BLD AUTO: 0 K/UL (ref 0–0.5)
IMM GRANULOCYTES NFR BLD AUTO: 0 % (ref 0–5)
LYMPHOCYTES # BLD: 1.7 K/UL (ref 0.5–4.6)
LYMPHOCYTES NFR BLD: 23 % (ref 13–44)
MCH RBC QN AUTO: 28.2 PG (ref 26.1–32.9)
MCHC RBC AUTO-ENTMCNC: 32.6 G/DL (ref 31.4–35)
MCV RBC AUTO: 86.5 FL (ref 82–102)
MONOCYTES # BLD: 0.5 K/UL (ref 0.1–1.3)
MONOCYTES NFR BLD: 7 % (ref 4–12)
NEUTS SEG # BLD: 5.1 K/UL (ref 1.7–8.2)
NEUTS SEG NFR BLD: 67 % (ref 43–78)
NRBC # BLD: 0 K/UL (ref 0–0.2)
PLATELET # BLD AUTO: 232 K/UL (ref 150–450)
PMV BLD AUTO: 11.5 FL (ref 9.4–12.3)
RBC # BLD AUTO: 4.96 M/UL (ref 4.05–5.2)
WBC # BLD AUTO: 7.5 K/UL (ref 4.3–11.1)

## 2023-12-11 PROCEDURE — 99214 OFFICE O/P EST MOD 30 MIN: CPT | Performed by: INTERNAL MEDICINE

## 2023-12-11 RX ORDER — LEVOTHYROXINE SODIUM 50 UG/1
50 TABLET ORAL DAILY
COMMUNITY
Start: 2023-11-01

## 2023-12-11 ASSESSMENT — PATIENT HEALTH QUESTIONNAIRE - PHQ9
SUM OF ALL RESPONSES TO PHQ QUESTIONS 1-9: 0
1. LITTLE INTEREST OR PLEASURE IN DOING THINGS: 0
SUM OF ALL RESPONSES TO PHQ QUESTIONS 1-9: 0
2. FEELING DOWN, DEPRESSED OR HOPELESS: 0
SUM OF ALL RESPONSES TO PHQ9 QUESTIONS 1 & 2: 0

## 2023-12-11 ASSESSMENT — ENCOUNTER SYMPTOMS
GASTROINTESTINAL NEGATIVE: 1
RESPIRATORY NEGATIVE: 1

## 2023-12-11 NOTE — PROGRESS NOTES
for allergic reaction 0.3 mL 0     No current facility-administered medications for this visit. Allergies as of 12/11/2023 - Fully Reviewed 10/11/2023   Allergen Reaction Noted    Other  11/08/2022    Pecan nut (diagnostic) Other (See Comments) 01/01/2022    Sesame seed (diagnostic)  10/17/2022       Review of Systems   Constitutional: Negative. Respiratory: Negative. Cardiovascular: Negative. Gastrointestinal: Negative. Genitourinary: Negative. Musculoskeletal: Negative. Objective: There were no vitals taken for this visit. Physical Exam  Vitals and nursing note reviewed. Constitutional:       Appearance: Normal appearance. Cardiovascular:      Rate and Rhythm: Normal rate and regular rhythm. Pulses: Normal pulses. Heart sounds: Normal heart sounds. Pulmonary:      Effort: Pulmonary effort is normal.      Breath sounds: Normal breath sounds. Neurological:      Mental Status: She is alert. No results found for this visit on 12/11/23. Assessent & Plan    1. Hypothyroid - Goes to Dr. Lokesh Juan- s/p increase on thyroid meds  2. Increased burping- since February - has seen GI on PPI   3. Heart palpitations- does drink enough water has seen Cardiology   4. Multiple food allergies- has had allergy test - normal results with Allergist     The patient and/or patient representative voiced understanding and agreement with the current diagnoses, recommendations, and possible side effects. No follow-up provider specified.       Megan Zuñiga MD

## 2023-12-13 DIAGNOSIS — T78.40XA ALLERGY, INITIAL ENCOUNTER: ICD-10-CM

## 2023-12-13 DIAGNOSIS — Z91.018 MULTIPLE FOOD ALLERGIES: ICD-10-CM

## 2023-12-13 RX ORDER — EPINEPHRINE 0.3 MG/.3ML
INJECTION SUBCUTANEOUS
Qty: 2 EACH | Refills: 1 | Status: SHIPPED | OUTPATIENT
Start: 2023-12-13

## 2023-12-14 NOTE — TELEPHONE ENCOUNTER
From: Hai Puri  To: Dr. Kin Naylor: 2023 8:04 PM EST  Subject: Alex Mcdowell, I forgot to ask at my appointment if I could get a new epipen ordered to [facility]. My old one . Thank you!

## 2024-03-20 ENCOUNTER — OFFICE VISIT (OUTPATIENT)
Dept: PRIMARY CARE CLINIC | Facility: CLINIC | Age: 24
End: 2024-03-20
Payer: COMMERCIAL

## 2024-03-20 VITALS — BODY MASS INDEX: 23.19 KG/M2 | WEIGHT: 153 LBS | HEIGHT: 68 IN

## 2024-03-20 DIAGNOSIS — F41.9 ANXIETY: Primary | ICD-10-CM

## 2024-03-20 PROCEDURE — 99214 OFFICE O/P EST MOD 30 MIN: CPT | Performed by: INTERNAL MEDICINE

## 2024-03-20 RX ORDER — BUSPIRONE HYDROCHLORIDE 5 MG/1
5 TABLET ORAL 2 TIMES DAILY
Qty: 60 TABLET | Refills: 1 | Status: SHIPPED | OUTPATIENT
Start: 2024-03-20 | End: 2024-05-19

## 2024-03-20 ASSESSMENT — ENCOUNTER SYMPTOMS: FEELING OF CHOKING: 0

## 2024-03-20 NOTE — PROGRESS NOTES
Faxed DMe order and last office note to [address] [place] [address].  [phone] PO)       ketoconazole (NIZORAL) 2 % shampoo        No current facility-administered medications for this visit.       Allergies as of 03/20/2024 - Fully Reviewed 03/20/2024   Allergen Reaction Noted    Other  11/08/2022    Pecan nut (diagnostic) Other (See Comments) 01/01/2022    Sesame seed (diagnostic)  10/17/2022       Review of Systems   Constitutional:  Negative for irritability.   Cardiovascular:  Negative for chest pain.   Psychiatric/Behavioral:  Negative for decreased concentration.        Objective:    Height 1.727 m (5' 8\"), weight 69.4 kg (153 lb).    Physical Exam  Vitals reviewed.   Neurological:      Mental Status: She is alert.   Psychiatric:         Mood and Affect: Mood normal.         Behavior: Behavior normal.         Thought Content: Thought content normal.         Judgment: Judgment normal.         No results found for this visit on 03/20/24.    Assessent & Plan    1. Anxiety  -     busPIRone (BUSPAR) 5 MG tablet; Take 1 tablet by mouth 2 times daily, Disp-60 tablet, R-1Normal  -     [facility] (Counseling)       Pt not pregnant or thinking about that  Start buspar consider SSRI or Welbutrin if not improved  Advised of AE   Take separate from thyroid med  Follow up in 4 weeks   The patient and/or patient representative voiced understanding and agreement with the current diagnoses, recommendations, and possible side effects.    No follow-up provider specified.      Belinda Bear MD

## 2024-04-16 ASSESSMENT — ENCOUNTER SYMPTOMS: FEELING OF CHOKING: 0

## 2024-04-16 NOTE — PROGRESS NOTES
Number of children: Not on file    Years of education: Not on file    Highest education level: Not on file   Occupational History    Not on file   Tobacco Use    Smoking status: Never    Smokeless tobacco: Never   Vaping Use    Vaping Use: Never used   Substance and Sexual Activity    Alcohol use: Yes     Comment: One drink every 6 months    Drug use: Never    Sexual activity: Yes     Partners: Male     Birth control/protection: Condom     Comment: single partner   Other Topics Concern    Not on file   Social History Narrative    Not on file     Social Determinants of Health     Financial Resource Strain: Low Risk  (6/14/2023)    Overall Financial Resource Strain (CARDIA)     Difficulty of Paying Living Expenses: Not hard at all   Food Insecurity: Not on file (6/14/2023)   Transportation Needs: Unknown (6/14/2023)    PRAPARE - Transportation     Lack of Transportation (Medical): Not on file     Lack of Transportation (Non-Medical): No   Physical Activity: Sufficiently Active (11/7/2022)    Exercise Vital Sign     Days of Exercise per Week: 5 days     Minutes of Exercise per Session: 40 min   Stress: Not on file   Social Connections: Not on file   Intimate Partner Violence: Not At Risk (11/7/2022)    Humiliation, Afraid, Rape, and Kick questionnaire     Fear of Current or Ex-Partner: No     Emotionally Abused: No     Physically Abused: No     Sexually Abused: No   Housing Stability: Unknown (6/14/2023)    Housing Stability Vital Sign     Unable to Pay for Housing in the Last Year: Not on file     Number of Places Lived in the Last Year: Not on file     Unstable Housing in the Last Year: No       Current Outpatient Medications   Medication Sig Dispense Refill    busPIRone (BUSPAR) 5 MG tablet Take 1 tablet by mouth 2 times daily 60 tablet 1    EPINEPHrine (EPIPEN) 0.3 MG/0.3ML SOAJ injection INJECT 1 PEN IN THE MUSCLE ONE TIME AS DIRECTED FOR ALLERGIC REACTION 2 each 1    LEVOXYL 50 MCG tablet Take 1 tablet by mouth

## 2024-04-17 ENCOUNTER — TELEMEDICINE (OUTPATIENT)
Dept: PRIMARY CARE CLINIC | Facility: CLINIC | Age: 24
End: 2024-04-17
Payer: COMMERCIAL

## 2024-04-17 DIAGNOSIS — F41.9 ANXIETY: Primary | ICD-10-CM

## 2024-04-17 PROCEDURE — 99213 OFFICE O/P EST LOW 20 MIN: CPT | Performed by: INTERNAL MEDICINE

## 2024-05-15 ENCOUNTER — PATIENT MESSAGE (OUTPATIENT)
Dept: PRIMARY CARE CLINIC | Facility: CLINIC | Age: 24
End: 2024-05-15

## 2024-05-15 DIAGNOSIS — F41.9 ANXIETY: ICD-10-CM

## 2024-05-15 RX ORDER — BUSPIRONE HYDROCHLORIDE 10 MG/1
10 TABLET ORAL 2 TIMES DAILY
Qty: 60 TABLET | Refills: 0 | Status: SHIPPED | OUTPATIENT
Start: 2024-05-15 | End: 2024-06-14

## 2024-05-15 NOTE — TELEPHONE ENCOUNTER
From: Smitha Quiroz  To: Dr. Belinda Bear  Sent: 5/15/2024 12:27 PM EDT  Subject: Buspirone    Hi Dr. Bear,  Just following up regarding my buspirone. I started taking it about a month ago and started on 2.5 mg twice a day (splitting 5 mg pills in half). After about a week or two, I went up to 5 mg twice a day. I haven't noticed any side effects. I feel like it might be helping a little bit but would like to try an increased dose if possible. Maybe we can try 10 mg twice a day? Please let me know if this is okay and if so, when should I increase the dose (I've been doing 5 mg twice a day for about 2 weeks now). Also since I have 5 mg tablets, can I just take two of them until I run out? Of note, I have not particularly noticed a worsening of anxiety right before I take my next dose (so not really concerned it's wearing off or anything like that).

## 2024-06-17 ENCOUNTER — PATIENT MESSAGE (OUTPATIENT)
Dept: PRIMARY CARE CLINIC | Facility: CLINIC | Age: 24
End: 2024-06-17

## 2024-06-17 RX ORDER — BUSPIRONE HYDROCHLORIDE 5 MG/1
5 TABLET ORAL 2 TIMES DAILY
Qty: 180 TABLET | Refills: 0 | Status: SHIPPED | OUTPATIENT
Start: 2024-06-17 | End: 2024-12-14

## 2024-06-17 NOTE — TELEPHONE ENCOUNTER
From: Smitha Quiroz  To: Dr. Belinda Bear  Sent: 6/17/2024 1:14 PM EDT  Subject: Pharmacy change    Hi Dr. Bear, for long term prescriptions my insurance requires they go to [facility] pharmacy in [place], [place]. Will you send my buspirone 5 mg BID prescription to them instead of [facility]? (I'm going to stick to 5 mg BID dosing for now until I take my boards exam, then maybe try increasing to 10 mg). Thank you!

## 2024-07-10 ENCOUNTER — OFFICE VISIT (OUTPATIENT)
Dept: PRIMARY CARE CLINIC | Facility: CLINIC | Age: 24
End: 2024-07-10
Payer: COMMERCIAL

## 2024-07-10 VITALS
BODY MASS INDEX: 22.55 KG/M2 | OXYGEN SATURATION: 99 % | DIASTOLIC BLOOD PRESSURE: 78 MMHG | HEIGHT: 68 IN | SYSTOLIC BLOOD PRESSURE: 122 MMHG | HEART RATE: 88 BPM | WEIGHT: 148.8 LBS

## 2024-07-10 DIAGNOSIS — Z00.00 ANNUAL PHYSICAL EXAM: Primary | ICD-10-CM

## 2024-07-10 PROCEDURE — 99395 PREV VISIT EST AGE 18-39: CPT | Performed by: INTERNAL MEDICINE

## 2024-07-10 RX ORDER — BUSPIRONE HYDROCHLORIDE 10 MG/1
10 TABLET ORAL 2 TIMES DAILY
Qty: 60 TABLET | Refills: 1 | Status: SHIPPED | OUTPATIENT
Start: 2024-07-10 | End: 2024-09-08

## 2024-07-10 SDOH — ECONOMIC STABILITY: INCOME INSECURITY: HOW HARD IS IT FOR YOU TO PAY FOR THE VERY BASICS LIKE FOOD, HOUSING, MEDICAL CARE, AND HEATING?: NOT VERY HARD

## 2024-07-10 SDOH — ECONOMIC STABILITY: FOOD INSECURITY: WITHIN THE PAST 12 MONTHS, THE FOOD YOU BOUGHT JUST DIDN'T LAST AND YOU DIDN'T HAVE MONEY TO GET MORE.: NEVER TRUE

## 2024-07-10 SDOH — ECONOMIC STABILITY: HOUSING INSECURITY
IN THE LAST 12 MONTHS, WAS THERE A TIME WHEN YOU DID NOT HAVE A STEADY PLACE TO SLEEP OR SLEPT IN A SHELTER (INCLUDING NOW)?: NO

## 2024-07-10 SDOH — ECONOMIC STABILITY: FOOD INSECURITY: WITHIN THE PAST 12 MONTHS, YOU WORRIED THAT YOUR FOOD WOULD RUN OUT BEFORE YOU GOT MONEY TO BUY MORE.: NEVER TRUE

## 2024-07-10 ASSESSMENT — PATIENT HEALTH QUESTIONNAIRE - PHQ9
SUM OF ALL RESPONSES TO PHQ QUESTIONS 1-9: 0
SUM OF ALL RESPONSES TO PHQ QUESTIONS 1-9: 0
1. LITTLE INTEREST OR PLEASURE IN DOING THINGS: NOT AT ALL
SUM OF ALL RESPONSES TO PHQ9 QUESTIONS 1 & 2: 0
2. FEELING DOWN, DEPRESSED OR HOPELESS: NOT AT ALL
SUM OF ALL RESPONSES TO PHQ QUESTIONS 1-9: 0
SUM OF ALL RESPONSES TO PHQ QUESTIONS 1-9: 0

## 2024-07-10 ASSESSMENT — ENCOUNTER SYMPTOMS
NAUSEA: 0
WHEEZING: 0
RECTAL PAIN: 0
ALLERGIC/IMMUNOLOGIC NEGATIVE: 1
CHOKING: 0
ABDOMINAL DISTENTION: 0
ABDOMINAL PAIN: 0
DIARRHEA: 0
ANAL BLEEDING: 0
EYE PAIN: 0
EYE DISCHARGE: 0
BACK PAIN: 0
RHINORRHEA: 0
EYE REDNESS: 0
GASTROINTESTINAL NEGATIVE: 1
BLOOD IN STOOL: 0
COUGH: 0
EYES NEGATIVE: 1
EYE ITCHING: 0
SHORTNESS OF BREATH: 0
CHEST TIGHTNESS: 0
RESPIRATORY NEGATIVE: 1
STRIDOR: 0
SINUS PRESSURE: 0
CONSTIPATION: 0
COLOR CHANGE: 0

## 2024-07-10 NOTE — PROGRESS NOTES
FOLLOW UP VISIT    Subjective:    Smitha Quiroz (: 2000) is a 23 y.o., female,   Chief Complaint   Patient presents with    Annual Exam     fasting       HPI:  23 year old in for cpe          The following portions of the patient's history were reviewed and updated as appropriate:      Past Medical History:   Diagnosis Date    Hypothyroidism 2021    Irritable bowel syndrome 10/2021    Saw a GI doctor for chronic loose stools and she said I probably have IBS       Past Surgical History:   Procedure Laterality Date    WISDOM TOOTH EXTRACTION         Family History   Problem Relation Age of Onset    Alcohol Abuse Mother     Other Mother         Brain aneurysm    High Cholesterol Father     Cancer Maternal Grandmother         Pancreatic    Miscarriages / Stillbirths Maternal Grandmother     Cancer Paternal Grandfather         Bladder    Coronary Art Dis Paternal Grandfather     Heart Attack Paternal Grandfather     Heart Disease Paternal Grandfather     High Cholesterol Paternal Grandfather     Allergy (Severe) Sister         Nuts and seeds    Asthma Sister     Other Sister         Eczema    Learning Disabilities Brother     Other Brother         Epilepsy, celiac    Miscarriages / Stillbirths Maternal Aunt     Obesity Maternal Aunt        Social History     Socioeconomic History    Marital status: Single     Spouse name: Not on file    Number of children: Not on file    Years of education: Not on file    Highest education level: Not on file   Occupational History    Not on file   Tobacco Use    Smoking status: Never    Smokeless tobacco: Never   Vaping Use    Vaping Use: Never used   Substance and Sexual Activity    Alcohol use: Yes     Comment: One drink every 6 months    Drug use: Never    Sexual activity: Yes     Partners: Male     Birth control/protection: Condom     Comment: single partner   Other Topics Concern    Not on file   Social History Narrative    Not on file     Social Determinants of Health

## 2024-07-15 DIAGNOSIS — Z00.00 ANNUAL PHYSICAL EXAM: ICD-10-CM

## 2024-07-15 LAB
ALBUMIN SERPL-MCNC: 4.2 G/DL (ref 3.5–5)
ALBUMIN/GLOB SERPL: 1.6 (ref 1–1.9)
ALP SERPL-CCNC: 55 U/L (ref 35–104)
ALT SERPL-CCNC: 16 U/L (ref 12–65)
ANION GAP SERPL CALC-SCNC: 11 MMOL/L (ref 9–18)
APPEARANCE UR: CLEAR
AST SERPL-CCNC: 25 U/L (ref 15–37)
BACTERIA URNS QL MICRO: NEGATIVE /HPF
BASOPHILS # BLD: 0 K/UL (ref 0–0.2)
BASOPHILS NFR BLD: 1 % (ref 0–2)
BILIRUB SERPL-MCNC: 0.5 MG/DL (ref 0–1.2)
BILIRUB UR QL: NEGATIVE
BUN SERPL-MCNC: 8 MG/DL (ref 6–23)
CALCIUM SERPL-MCNC: 9.2 MG/DL (ref 8.8–10.2)
CHLORIDE SERPL-SCNC: 103 MMOL/L (ref 98–107)
CHOLEST SERPL-MCNC: 150 MG/DL (ref 0–200)
CO2 SERPL-SCNC: 23 MMOL/L (ref 20–28)
COLOR UR: NORMAL
CREAT SERPL-MCNC: 0.67 MG/DL (ref 0.6–1.1)
DIFFERENTIAL METHOD BLD: NORMAL
EOSINOPHIL # BLD: 0.2 K/UL (ref 0–0.8)
EOSINOPHIL NFR BLD: 3 % (ref 0.5–7.8)
EPI CELLS #/AREA URNS HPF: NORMAL /HPF (ref 0–5)
ERYTHROCYTE [DISTWIDTH] IN BLOOD BY AUTOMATED COUNT: 13.1 % (ref 11.9–14.6)
GLOBULIN SER CALC-MCNC: 2.6 G/DL (ref 2.3–3.5)
GLUCOSE SERPL-MCNC: 82 MG/DL (ref 70–99)
GLUCOSE UR STRIP.AUTO-MCNC: NEGATIVE MG/DL
HCT VFR BLD AUTO: 42.6 % (ref 35.8–46.3)
HDLC SERPL-MCNC: 45 MG/DL (ref 40–60)
HDLC SERPL: 3.3 (ref 0–5)
HGB BLD-MCNC: 13.9 G/DL (ref 11.7–15.4)
HGB UR QL STRIP: NEGATIVE
HYALINE CASTS URNS QL MICRO: NORMAL /LPF
IMM GRANULOCYTES # BLD AUTO: 0 K/UL (ref 0–0.5)
IMM GRANULOCYTES NFR BLD AUTO: 0 % (ref 0–5)
KETONES UR QL STRIP.AUTO: NEGATIVE MG/DL
LDLC SERPL CALC-MCNC: 88 MG/DL (ref 0–100)
LEUKOCYTE ESTERASE UR QL STRIP.AUTO: NEGATIVE
LYMPHOCYTES # BLD: 1.9 K/UL (ref 0.5–4.6)
LYMPHOCYTES NFR BLD: 33 % (ref 13–44)
MCH RBC QN AUTO: 28.5 PG (ref 26.1–32.9)
MCHC RBC AUTO-ENTMCNC: 32.6 G/DL (ref 31.4–35)
MCV RBC AUTO: 87.3 FL (ref 82–102)
MONOCYTES # BLD: 0.5 K/UL (ref 0.1–1.3)
MONOCYTES NFR BLD: 8 % (ref 4–12)
MUCOUS THREADS URNS QL MICRO: 0 /LPF
NEUTS SEG # BLD: 3.3 K/UL (ref 1.7–8.2)
NEUTS SEG NFR BLD: 55 % (ref 43–78)
NITRITE UR QL STRIP.AUTO: NEGATIVE
NRBC # BLD: 0 K/UL (ref 0–0.2)
PH UR STRIP: 6 (ref 5–9)
PLATELET # BLD AUTO: 185 K/UL (ref 150–450)
PMV BLD AUTO: 12.2 FL (ref 9.4–12.3)
POTASSIUM SERPL-SCNC: 3.8 MMOL/L (ref 3.5–5.1)
PROT SERPL-MCNC: 6.8 G/DL (ref 6.3–8.2)
PROT UR STRIP-MCNC: NEGATIVE MG/DL
RBC # BLD AUTO: 4.88 M/UL (ref 4.05–5.2)
RBC #/AREA URNS HPF: NORMAL /HPF (ref 0–5)
SODIUM SERPL-SCNC: 136 MMOL/L (ref 136–145)
SP GR UR REFRACTOMETRY: 1.01 (ref 1–1.02)
TRIGL SERPL-MCNC: 86 MG/DL (ref 0–150)
TSH, 3RD GENERATION: 7.45 UIU/ML (ref 0.27–4.2)
URINE CULTURE IF INDICATED: NORMAL
UROBILINOGEN UR QL STRIP.AUTO: 0.2 EU/DL (ref 0.2–1)
VLDLC SERPL CALC-MCNC: 17 MG/DL (ref 6–23)
WBC # BLD AUTO: 5.9 K/UL (ref 4.3–11.1)
WBC URNS QL MICRO: NORMAL /HPF (ref 0–4)

## 2024-08-22 ENCOUNTER — HOSPITAL ENCOUNTER (OUTPATIENT)
Dept: NUTRITION | Age: 24
Setting detail: RECURRING SERIES
Discharge: HOME | End: 2024-08-25

## 2024-08-22 PROCEDURE — 97802 MEDICAL NUTRITION INDIV IN: CPT

## 2024-08-23 NOTE — PROGRESS NOTES
Monica Simmons, MS, RD, LD  Outpatient Registered Dietitian  [facility]  Phone: [phone]  Fax: [phone]    Smitha Quiroz is a 24 y.o. female referred with the following diagnosis (es): Celiac disease [K90.0]     ASSESSMENT  PMH includes hypothyroidism, anxiety   Labs: reviewed   Meds: levoxyl     Patient stated goal: wants to discuss label reading without GF label, make sure she's having a well balanced diet.     New diagnosis of celiac disease, both sibling have it as well. Follows a vegetarian diet.Has been experiencing soft stool for 3 years, since adopting a GF diet states this has not improved, she does not want to discuss this today. Does not endorse bloating, abdominal pain.   Notes spicy foods and beans cause loose stool.   Both her and boyfriend cook at home, have switched to GF products.     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     Stage of Change: Preparation.    Anthropometrics:   Estimated body mass index is 22.62 kg/m² as calculated from the following:    Height as of 7/10/24: 1.727 m (5' 8\").    Weight as of 7/10/24: 67.5 kg (148 lb 12.8 oz).     Estimated Nutrition Needs:  MSJ x 1.375  (2014kcal/d)      Protein: 101g/day (20%)     DIAGNOSIS:  Unbalanced diet related to nutrition knowledge deficit as evidenced by new diagnosis of celiac disease, eating pattern as above.    INTERVENTION (45 minutes):  Specific Topics Discussed:  - Appropriate food choices for Gluten Free diet  - Choosing naturally gluten free foods first when building meals (fruits, vegetables, meat/fish/poultry, nuts/seeds, etc.).   - Label reading for identifying GF products  - High fiber GF options  - Resources for grocery shopping.  - Strategies for

## 2024-08-28 DIAGNOSIS — Z00.00 ANNUAL PHYSICAL EXAM: ICD-10-CM

## 2024-08-28 RX ORDER — BUSPIRONE HYDROCHLORIDE 10 MG/1
10 TABLET ORAL 2 TIMES DAILY
Qty: 60 TABLET | Refills: 1 | Status: SHIPPED | OUTPATIENT
Start: 2024-08-28

## 2024-10-17 ENCOUNTER — OFFICE VISIT (OUTPATIENT)
Dept: OBGYN CLINIC | Age: 24
End: 2024-10-17
Payer: COMMERCIAL

## 2024-10-17 VITALS
DIASTOLIC BLOOD PRESSURE: 72 MMHG | HEIGHT: 68 IN | BODY MASS INDEX: 23.07 KG/M2 | SYSTOLIC BLOOD PRESSURE: 98 MMHG | WEIGHT: 152.2 LBS

## 2024-10-17 DIAGNOSIS — Z13.89 SCREENING FOR GENITOURINARY CONDITION: ICD-10-CM

## 2024-10-17 DIAGNOSIS — Z01.419 WELL WOMAN EXAM: Primary | ICD-10-CM

## 2024-10-17 LAB
BILIRUBIN, URINE, POC: NEGATIVE
BLOOD URINE, POC: NEGATIVE
GLUCOSE URINE, POC: NEGATIVE
KETONES, URINE, POC: NEGATIVE
LEUKOCYTE ESTERASE, URINE, POC: NEGATIVE
NITRITE, URINE, POC: NEGATIVE
PH, URINE, POC: 6 (ref 4.6–8)
PROTEIN,URINE, POC: NEGATIVE
SPECIFIC GRAVITY, URINE, POC: 1.02 (ref 1–1.03)
URINALYSIS CLARITY, POC: CLEAR
URINALYSIS COLOR, POC: YELLOW
UROBILINOGEN, POC: NORMAL MG/DL

## 2024-10-17 PROCEDURE — 81002 URINALYSIS NONAUTO W/O SCOPE: CPT | Performed by: OBSTETRICS & GYNECOLOGY

## 2024-10-17 PROCEDURE — 99395 PREV VISIT EST AGE 18-39: CPT | Performed by: OBSTETRICS & GYNECOLOGY

## 2024-10-17 NOTE — PROGRESS NOTES
Cancer Maternal Grandmother         Pancreatic    Miscarriages / Stillbirths Maternal Grandmother     Cancer Paternal Grandfather         Bladder    Coronary Art Dis Paternal Grandfather     Heart Attack Paternal Grandfather     Heart Disease Paternal Grandfather     High Cholesterol Paternal Grandfather     Allergy (Severe) Sister         Nuts and seeds    Asthma Sister     Other Sister         Eczema    Learning Disabilities Brother     Other Brother         Epilepsy, celiac    Miscarriages / Stillbirths Maternal Aunt     Obesity Maternal Aunt        Review of Systems - General ROS: negative except for that discussed in HPI      ROS:  Feeling well. No dyspnea or chest pain on exertion.  No abdominal pain, change in bowel habits, black or bloody stools.  No urinary tract symptoms. No neurological complaints.    Objective:   There were no vitals taken for this visit.    No results found for any visits on 10/17/24.     The patient appears well, alert, oriented x 3, in no distress.  ENT normal.  Neck supple. No adenopathy or thyromegaly.   Lungs:  clear, good air entry, no wheezes, rhonchi or rales.   Heart:  S1 and S2 normal, no murmurs, regular rate and rhythm.  Abdomen:  soft without tenderness, guarding, mass or organomegaly.   Extremities show no edema, normal peripheral pulses.   Neurological is normal, no focal findings.    BREAST EXAM: {pe breast exam:999621::\"breasts appear normal, no suspicious masses, no skin or nipple changes or axillary nodes\"}    PELVIC EXAM: External genitalia is {NORMAL_ABNORMAL:54109}, urethra, urethra meatus and bladder are midline well supported. Vagina is well rugated, Cervix comes into full view and is {NORMAL_ABNORMAL:23672}. Uterus is ***, *** week size, no ovarian masses palpated    Assessment/Plan:   {Assessment and Plan Chronic Disease:7357992356}    {gyn plan:121633::\"mammogram\",\"pap smear\",\"return annually or prn\"}    CONI BOLANOS RN       
Answer:   10/10/2024    AMB POC URINALYSIS DIP STICK MANUAL W/O MICRO       counseled on breast self exam  Pt to call if mass returns outside of secretory phase can order us of area she describes as 12 on left  return annually or prn

## 2024-11-26 ENCOUNTER — TELEPHONE (OUTPATIENT)
Dept: OBGYN CLINIC | Age: 24
End: 2024-11-26

## 2024-11-26 NOTE — TELEPHONE ENCOUNTER
Gyn patient called to scheduled an appointment with Dr. Jalloh.   Patient states she was x5 days late on her period. She had x 2 negative urine pregnancy test at home. She uses condoms for protection however patient states she does not have penetrative sex. States she thinks she was late on her period due to her hashimoto's. Reports in 2022 she had a similar issue happen. The soonest appointment with Dr. Jalloh is 12/10/24, appointment scheduled and all questions were answered.

## 2024-12-31 RX ORDER — PREDNISOLONE ACETATE 10 MG/ML
SUSPENSION/ DROPS OPHTHALMIC
COMMUNITY
Start: 2024-12-04

## 2025-01-02 ENCOUNTER — TELEMEDICINE (OUTPATIENT)
Dept: PRIMARY CARE CLINIC | Facility: CLINIC | Age: 25
End: 2025-01-02
Payer: COMMERCIAL

## 2025-01-02 DIAGNOSIS — I49.3 PVC'S (PREMATURE VENTRICULAR CONTRACTIONS): ICD-10-CM

## 2025-01-02 DIAGNOSIS — K58.8 OTHER IRRITABLE BOWEL SYNDROME: ICD-10-CM

## 2025-01-02 DIAGNOSIS — F41.9 ANXIETY: Primary | ICD-10-CM

## 2025-01-02 DIAGNOSIS — E06.3 HASHIMOTO'S THYROIDITIS: ICD-10-CM

## 2025-01-02 PROCEDURE — 99214 OFFICE O/P EST MOD 30 MIN: CPT | Performed by: INTERNAL MEDICINE

## 2025-01-02 RX ORDER — ESCITALOPRAM OXALATE 10 MG/1
10 TABLET ORAL DAILY
Qty: 30 TABLET | Refills: 1 | Status: SHIPPED | OUTPATIENT
Start: 2025-01-02

## 2025-01-02 NOTE — PROGRESS NOTES
(Non-Medical): No   Physical Activity: Sufficiently Active (11/7/2022)    Exercise Vital Sign     Days of Exercise per Week: 5 days     Minutes of Exercise per Session: 40 min   Stress: Not on file   Social Connections: Unknown (3/21/2021)    Received from [email or web address], [email or web address]    Social Connections     Frequency of Communication with Friends and Family: Not asked     Frequency of Social Gatherings with Friends and Family: Not asked   Intimate Partner Violence: Not At Risk (11/7/2022)    Humiliation, Afraid, Rape, and Kick questionnaire     Fear of Current or Ex-Partner: No     Emotionally Abused: No     Physically Abused: No     Sexually Abused: No   Housing Stability: Unknown (7/10/2024)    Housing Stability Vital Sign     Unable to Pay for Housing in the Last Year: Not on file     Number of Places Lived in the Last Year: Not on file     Unstable Housing in the Last Year: No       Current Outpatient Medications   Medication Sig Dispense Refill    prednisoLONE acetate (PRED FORTE) 1 % ophthalmic suspension SHAKE LIQUID AND INSTILL 1 DROP IN RIGHT EYE 2 TO 3 TIMES EVERY DAY      Naproxen Sodium (ALEVE PO) Take by mouth      busPIRone (BUSPAR) 10 MG tablet take 1 Tablet by mouth twice a day 60 tablet 1    EPINEPHrine (EPIPEN) 0.3 MG/0.3ML SOAJ injection INJECT 1 PEN IN THE MUSCLE ONE TIME AS DIRECTED FOR ALLERGIC REACTION 2 each 1    levothyroxine (SYNTHROID) 125 MCG tablet Take 0.5 tablets by mouth daily      Multiple Vitamin (MULTI-VITAMIN PO)       ketoconazole (NIZORAL) 2 % shampoo        No current facility-administered medications for this visit.       Allergies as of 01/02/2025 - Fully Reviewed 10/17/2024   Allergen Reaction Noted    Other  11/08/2022    Pecan nut (diagnostic) Other (See Comments) 01/01/2022    Sesame seed (diagnostic)  10/17/2022       Review of Systems    Objective:    There were no vitals taken for this visit.    Physical Exam    No results found for this visit on

## 2025-01-14 DIAGNOSIS — F41.9 ANXIETY: ICD-10-CM

## 2025-01-14 RX ORDER — ESCITALOPRAM OXALATE 10 MG/1
10 TABLET ORAL DAILY
Qty: 30 TABLET | Refills: 1 | Status: SHIPPED | OUTPATIENT
Start: 2025-01-14

## 2025-01-18 DIAGNOSIS — Z00.00 ANNUAL PHYSICAL EXAM: ICD-10-CM

## 2025-01-19 ENCOUNTER — PATIENT MESSAGE (OUTPATIENT)
Dept: PRIMARY CARE CLINIC | Facility: CLINIC | Age: 25
End: 2025-01-19

## 2025-01-20 RX ORDER — BUSPIRONE HYDROCHLORIDE 10 MG/1
10 TABLET ORAL 2 TIMES DAILY
Qty: 60 TABLET | Refills: 1 | OUTPATIENT
Start: 2025-01-20

## 2025-02-05 DIAGNOSIS — F41.9 ANXIETY: ICD-10-CM

## 2025-02-05 RX ORDER — ESCITALOPRAM OXALATE 10 MG/1
10 TABLET ORAL DAILY
Qty: 30 TABLET | Refills: 1 | OUTPATIENT
Start: 2025-02-05

## 2025-02-11 ENCOUNTER — TELEMEDICINE (OUTPATIENT)
Dept: PRIMARY CARE CLINIC | Facility: CLINIC | Age: 25
End: 2025-02-11
Payer: COMMERCIAL

## 2025-02-11 DIAGNOSIS — F41.9 ANXIETY: Primary | ICD-10-CM

## 2025-02-11 DIAGNOSIS — E06.3 HASHIMOTO'S THYROIDITIS: ICD-10-CM

## 2025-02-11 PROCEDURE — 99214 OFFICE O/P EST MOD 30 MIN: CPT | Performed by: INTERNAL MEDICINE

## 2025-02-11 RX ORDER — LEVOTHYROXINE SODIUM 75 UG/1
75 TABLET ORAL DAILY
COMMUNITY

## 2025-02-11 RX ORDER — ESCITALOPRAM OXALATE 10 MG/1
10 TABLET ORAL DAILY
Qty: 90 TABLET | Refills: 1 | Status: SHIPPED | OUTPATIENT
Start: 2025-02-11

## 2025-02-11 SDOH — ECONOMIC STABILITY: INCOME INSECURITY: IN THE LAST 12 MONTHS, WAS THERE A TIME WHEN YOU WERE NOT ABLE TO PAY THE MORTGAGE OR RENT ON TIME?: NO

## 2025-02-11 SDOH — ECONOMIC STABILITY: TRANSPORTATION INSECURITY
IN THE PAST 12 MONTHS, HAS LACK OF TRANSPORTATION KEPT YOU FROM MEETINGS, WORK, OR FROM GETTING THINGS NEEDED FOR DAILY LIVING?: NO

## 2025-02-11 SDOH — ECONOMIC STABILITY: FOOD INSECURITY: WITHIN THE PAST 12 MONTHS, THE FOOD YOU BOUGHT JUST DIDN'T LAST AND YOU DIDN'T HAVE MONEY TO GET MORE.: NEVER TRUE

## 2025-02-11 SDOH — ECONOMIC STABILITY: FOOD INSECURITY: WITHIN THE PAST 12 MONTHS, YOU WORRIED THAT YOUR FOOD WOULD RUN OUT BEFORE YOU GOT MONEY TO BUY MORE.: NEVER TRUE

## 2025-02-11 SDOH — ECONOMIC STABILITY: TRANSPORTATION INSECURITY
IN THE PAST 12 MONTHS, HAS THE LACK OF TRANSPORTATION KEPT YOU FROM MEDICAL APPOINTMENTS OR FROM GETTING MEDICATIONS?: NO

## 2025-02-11 NOTE — PROGRESS NOTES
taking it at 2 PM tomorrow and then at 8 PM the following day. A prescription refill for Lexapro 10 mg will be sent to [facility].    2. Hypothyroidism.  She is currently taking levothyroxine 75 mcg. Her medication list has been updated to reflect this change.    3. Allergies.  She is advised that she can take over-the-counter Claritin or Zyrtec for allergy season but should avoid decongestants.    4. Medication management.  She inquired about taking Aleve for menstrual pain and was previously advised that it is safe with her current medication regimen.     Smitha Quiroz, was evaluated through a synchronous (real-time) audio-video encounter. The patient (or guardian if applicable) is aware that this is a billable service, which includes applicable co-pays. This Virtual Visit was conducted with patient's (and/or legal guardian's) consent. Patient identification was verified, and a caregiver was present when appropriate.   The patient was located at Home: [address]   [facility] [address]  [place] [address]  Provider was located at Facility (Appt Dept): [address] [place] [address]  Confirm you are appropriately licensed, registered, or certified to deliver care in the state where the patient is located as indicated above. If you are not or unsure, please re-schedule the visit: Yes, I confirm.           An electronic signature was used to authenticate this note.        The patient and/or patient representative voiced understanding and agreement with the current diagnoses, recommendations, and possible side effects.    No follow-up provider specified.      Belinda Bear MD

## 2025-03-04 NOTE — PROGRESS NOTES
The patient is a 24 y.o.  who is seen today with reports of irregular cycles and concerns for PCOS. She is having issues with acne and coarse/dark hair growth on her legs and has lost 10 lbs over the past 8 weeks (has not been actively trying to lose weight). She was seen by GI this morning for and reported the weight loss to them and had a cbc and cmp drawn (results not back at this time).   Pt reports cycles were every 28 days but has had a few months where her cycles have been 5-6 days late. She has a history of hypothyroidism and has had issues with an irregular cycle in  when her levels were abnormal. 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  Sexual History:  single partner, contraception - condoms  Contraception:  condoms  Current Outpatient Medications on File Prior to Visit   Medication Sig Dispense Refill    levothyroxine (SYNTHROID) 75 MCG tablet Take 1 tablet by mouth Daily      escitalopram (LEXAPRO) 10 MG tablet Take 1 tablet by mouth daily 90 tablet 1    EPINEPHrine (EPIPEN) 0.3 MG/0.3ML SOAJ injection INJECT 1 PEN IN THE MUSCLE ONE TIME AS DIRECTED FOR ALLERGIC REACTION 2 each 1    Multiple Vitamin (MULTI-VITAMIN PO)       ketoconazole (NIZORAL) 2 % shampoo        No current facility-administered medications on file prior to visit.       ROS:  Feeling well. No dyspnea or chest pain on exertion.  No abdominal pain, change in bowel habits, black or bloody stools.  No urinary tract symptoms. GYN ROS: she complains of above.    PHYSICAL EXAM:  Blood pressure 100/62, height 1.727 m (5' 8\"), weight 65.6 kg (144 lb 9.6 oz), last menstrual period 2025.    The patient appears well,

## 2025-03-05 ENCOUNTER — OFFICE VISIT (OUTPATIENT)
Dept: OBGYN CLINIC | Age: 25
End: 2025-03-05
Payer: COMMERCIAL

## 2025-03-05 VITALS
BODY MASS INDEX: 21.92 KG/M2 | SYSTOLIC BLOOD PRESSURE: 100 MMHG | HEIGHT: 68 IN | WEIGHT: 144.6 LBS | DIASTOLIC BLOOD PRESSURE: 62 MMHG

## 2025-03-05 DIAGNOSIS — N93.8 DUB (DYSFUNCTIONAL UTERINE BLEEDING): ICD-10-CM

## 2025-03-05 DIAGNOSIS — N63.22 MASS OF UPPER INNER QUADRANT OF LEFT BREAST: Primary | ICD-10-CM

## 2025-03-05 PROCEDURE — 99214 OFFICE O/P EST MOD 30 MIN: CPT | Performed by: OBSTETRICS & GYNECOLOGY

## 2025-03-17 NOTE — PROGRESS NOTES
The patient is a 24 y.o.  who is seen for US to evaluate for possible PCOS. Pt last see 3/5/25 and recommended for left breast US for 12 O'clock mass. No appointment noted in computer. Pt reports they reached out to get her scheduled but she needs to call back.  With further discussion only 4-5 days late on period regularly which could just be in the normal range      US findings from today:  GYN U/S SECONDARY TO: Questionable PCOS, DUB. Pt refused TV, exam performed TA   CX: Appears WNL   UTERUS: Anteverted, slightly heterogenous   ENDO= 4.6 mm, no intracavity masses visualized.   Both ovaries visualized with increased volume and several follicles in the periphery, c/w PCOS in appearance.   No adn masses or free fluid visualized.   Uterine volume: 48.362    HISTORY:        Current Outpatient Medications on File Prior to Visit   Medication Sig Dispense Refill    levothyroxine (SYNTHROID) 75 MCG tablet Take 1 tablet by mouth Daily      escitalopram (LEXAPRO) 10 MG tablet Take 1 tablet by mouth daily 90 tablet 1    EPINEPHrine (EPIPEN) 0.3 MG/0.3ML SOAJ injection INJECT 1 PEN IN THE MUSCLE ONE TIME AS DIRECTED FOR ALLERGIC REACTION 2 each 1    Multiple Vitamin (MULTI-VITAMIN PO)       ketoconazole (NIZORAL) 2 % shampoo        No current facility-administered medications on file prior to visit.       ROS:  Feeling well. No dyspnea or chest pain on exertion.  No abdominal pain, change in bowel habits, black or bloody stools.  No urinary tract symptoms. GYN ROS: normal menses, no abnormal bleeding, pelvic pain or discharge, no breast pain or new or enlarging lumps on self exam.    PHYSICAL EXAM:  Blood pressure 110/70, height 1.727 m (5' 8\"), weight 64.9 kg (143 lb), last menstrual period 2025.    The patient appears well, alert, oriented x 3, in no distress.  Reviewed us which may suggest \" plump\" ovaries possible follicles at edge  IS most concerned about future fertility and diabetes  Would like to

## 2025-03-19 ENCOUNTER — OFFICE VISIT (OUTPATIENT)
Dept: OBGYN CLINIC | Age: 25
End: 2025-03-19
Payer: COMMERCIAL

## 2025-03-19 ENCOUNTER — PROCEDURE VISIT (OUTPATIENT)
Dept: OBGYN CLINIC | Age: 25
End: 2025-03-19
Payer: COMMERCIAL

## 2025-03-19 VITALS
DIASTOLIC BLOOD PRESSURE: 70 MMHG | SYSTOLIC BLOOD PRESSURE: 110 MMHG | BODY MASS INDEX: 21.67 KG/M2 | WEIGHT: 143 LBS | HEIGHT: 68 IN

## 2025-03-19 DIAGNOSIS — N93.8 DUB (DYSFUNCTIONAL UTERINE BLEEDING): Primary | ICD-10-CM

## 2025-03-19 DIAGNOSIS — E28.2 PCOS (POLYCYSTIC OVARIAN SYNDROME): Primary | ICD-10-CM

## 2025-03-19 PROCEDURE — 76857 US EXAM PELVIC LIMITED: CPT | Performed by: OBSTETRICS & GYNECOLOGY

## 2025-03-19 PROCEDURE — 99214 OFFICE O/P EST MOD 30 MIN: CPT | Performed by: OBSTETRICS & GYNECOLOGY

## 2025-03-26 ENCOUNTER — LAB (OUTPATIENT)
Dept: OBGYN CLINIC | Age: 25
End: 2025-03-26

## 2025-03-26 DIAGNOSIS — E28.2 PCOS (POLYCYSTIC OVARIAN SYNDROME): ICD-10-CM

## 2025-03-26 LAB — PROGEST SERPL-MCNC: 15.4 NG/ML

## 2025-03-27 ENCOUNTER — RESULTS FOLLOW-UP (OUTPATIENT)
Dept: OBGYN CLINIC | Age: 25
End: 2025-03-27

## 2025-04-04 ENCOUNTER — LAB (OUTPATIENT)
Dept: OBGYN CLINIC | Age: 25
End: 2025-04-04

## 2025-04-04 DIAGNOSIS — N93.8 DUB (DYSFUNCTIONAL UTERINE BLEEDING): Primary | ICD-10-CM

## 2025-04-04 DIAGNOSIS — E28.2 PCOS (POLYCYSTIC OVARIAN SYNDROME): ICD-10-CM

## 2025-04-04 LAB
EST. AVERAGE GLUCOSE BLD GHB EST-MCNC: 102 MG/DL
FSH SERPL-ACNC: 6 MIU/ML
HBA1C MFR BLD: 5.2 % (ref 0–5.6)
LH SERPL-ACNC: 6.8 MIU/ML

## 2025-04-05 LAB
INSULIN SERPL-ACNC: 10.1 UIU/ML (ref 2.6–24.9)
TESTOST SERPL-MCNC: 32 NG/DL (ref 13–71)

## 2025-04-09 LAB
TESTOST FREE SERPL-MCNC: 4.4 PG/ML (ref 0–4.2)
TESTOST SERPL-MCNC: 32 NG/DL (ref 13–71)

## 2025-04-12 LAB — DHEA SERPL-MCNC: 368 NG/DL (ref 31–701)

## 2025-04-22 ENCOUNTER — PATIENT MESSAGE (OUTPATIENT)
Dept: PRIMARY CARE CLINIC | Facility: CLINIC | Age: 25
End: 2025-04-22

## 2025-04-22 NOTE — TELEPHONE ENCOUNTER
Pt declined OV/VV at this time and reports she will go to a sports medicine clinic where they will be more likely to complete everything in one visit (X-ray/MRI)

## 2025-04-25 NOTE — PROGRESS NOTES
The patient is a 24 y.o.  who is seen to discuss recent lab results and further management of PCOS. Pt last seen 3/19/25    Day 3 labs  FSH 6  LH 6.8  Insulin 10.1  Free test 4.4  DHEA 368  A1C 5.2    Day 21 lab  Prog 15.40    US findings from today:  GYN U/S SECONDARY TO: Questionable PCOS, DUB. Pt refused TV, exam performed TA   CX: Appears WNL   UTERUS: Anteverted, slightly heterogenous   ENDO= 4.6 mm, no intracavity masses visualized.   Both ovaries visualized with increased volume and several follicles in the periphery, c/w PCOS in appearance.   No adn masses or free fluid visualized.   Uterine volume: 48.362    HISTORY:      Patient's last menstrual period was 2025 (exact date).    Current Outpatient Medications on File Prior to Visit   Medication Sig Dispense Refill    VITAMIN D PO Take by mouth      levothyroxine (SYNTHROID) 75 MCG tablet Take 88 mcg by mouth Daily Pt taking 88 mcg      escitalopram (LEXAPRO) 10 MG tablet Take 1 tablet by mouth daily 90 tablet 1    EPINEPHrine (EPIPEN) 0.3 MG/0.3ML SOAJ injection INJECT 1 PEN IN THE MUSCLE ONE TIME AS DIRECTED FOR ALLERGIC REACTION 2 each 1    ketoconazole (NIZORAL) 2 % shampoo       Multiple Vitamin (MULTI-VITAMIN PO)  (Patient not taking: Reported on 2025)       No current facility-administered medications on file prior to visit.       ROS:  Feeling well. No dyspnea or chest pain on exertion.  No abdominal pain, change in bowel habits, black or bloody stools.  No urinary tract symptoms. GYN ROS: normal menses, no abnormal bleeding, pelvic pain or discharge, no breast pain or new or enlarging lumps on self exam.    PHYSICAL EXAM:  Blood pressure 108/60, height 1.727 m (5' 8\"), weight 64.8 kg (142 lb 14.4 oz), last menstrual period 2025.    The patient appears well, alert, oriented x 3, in no distress.  Lungs are clear. Heart RRR, no murmurs. Abdomen soft without tenderness, guarding, mass or organomegaly.  Pelvic:

## 2025-04-28 ENCOUNTER — PATIENT MESSAGE (OUTPATIENT)
Dept: OBGYN CLINIC | Age: 25
End: 2025-04-28

## 2025-04-28 ENCOUNTER — OFFICE VISIT (OUTPATIENT)
Dept: OBGYN CLINIC | Age: 25
End: 2025-04-28
Payer: COMMERCIAL

## 2025-04-28 VITALS
SYSTOLIC BLOOD PRESSURE: 108 MMHG | DIASTOLIC BLOOD PRESSURE: 60 MMHG | BODY MASS INDEX: 21.66 KG/M2 | HEIGHT: 68 IN | WEIGHT: 142.9 LBS

## 2025-04-28 DIAGNOSIS — E28.2 PCOS (POLYCYSTIC OVARIAN SYNDROME): Primary | ICD-10-CM

## 2025-04-28 PROCEDURE — 99214 OFFICE O/P EST MOD 30 MIN: CPT | Performed by: OBSTETRICS & GYNECOLOGY

## 2025-04-28 RX ORDER — NORETHINDRONE ACETATE AND ETHINYL ESTRADIOL 1MG-20(21)
KIT ORAL
Qty: 4 PACKET | Refills: 3 | Status: SHIPPED | OUTPATIENT
Start: 2025-04-28 | End: 2025-04-28 | Stop reason: SDUPTHER

## 2025-04-28 RX ORDER — NORETHINDRONE ACETATE AND ETHINYL ESTRADIOL 1MG-20(21)
KIT ORAL
Qty: 4 PACKET | Refills: 3 | Status: SHIPPED | OUTPATIENT
Start: 2025-04-28

## 2025-04-28 NOTE — TELEPHONE ENCOUNTER
Pt requesting first fill of medication be sent to [facility] and subsequent fills at [facility].

## 2025-05-15 ENCOUNTER — OFFICE VISIT (OUTPATIENT)
Dept: PRIMARY CARE CLINIC | Facility: CLINIC | Age: 25
End: 2025-05-15
Payer: COMMERCIAL

## 2025-05-15 VITALS
DIASTOLIC BLOOD PRESSURE: 61 MMHG | BODY MASS INDEX: 21.52 KG/M2 | WEIGHT: 142 LBS | OXYGEN SATURATION: 98 % | SYSTOLIC BLOOD PRESSURE: 92 MMHG | HEIGHT: 68 IN | TEMPERATURE: 97.6 F | HEART RATE: 78 BPM

## 2025-05-15 DIAGNOSIS — T78.40XA ALLERGY, INITIAL ENCOUNTER: ICD-10-CM

## 2025-05-15 DIAGNOSIS — Z91.018 MULTIPLE FOOD ALLERGIES: ICD-10-CM

## 2025-05-15 DIAGNOSIS — F41.9 ANXIETY: ICD-10-CM

## 2025-05-15 PROCEDURE — 99214 OFFICE O/P EST MOD 30 MIN: CPT | Performed by: INTERNAL MEDICINE

## 2025-05-15 RX ORDER — ESCITALOPRAM OXALATE 10 MG/1
10 TABLET ORAL DAILY
Qty: 90 TABLET | Refills: 1 | Status: SHIPPED | OUTPATIENT
Start: 2025-05-15

## 2025-05-15 RX ORDER — EPINEPHRINE 0.3 MG/.3ML
INJECTION SUBCUTANEOUS
Qty: 2 EACH | Refills: 1 | Status: SHIPPED | OUTPATIENT
Start: 2025-05-15

## 2025-05-15 RX ORDER — NORETHINDRONE ACETATE AND ETHINYL ESTRADIOL 1MG-20(21)
KIT ORAL
Qty: 4 PACKET | Refills: 3 | Status: CANCELLED | OUTPATIENT
Start: 2025-05-15

## 2025-05-15 SDOH — ECONOMIC STABILITY: FOOD INSECURITY: WITHIN THE PAST 12 MONTHS, YOU WORRIED THAT YOUR FOOD WOULD RUN OUT BEFORE YOU GOT MONEY TO BUY MORE.: NEVER TRUE

## 2025-05-15 SDOH — ECONOMIC STABILITY: FOOD INSECURITY: WITHIN THE PAST 12 MONTHS, THE FOOD YOU BOUGHT JUST DIDN'T LAST AND YOU DIDN'T HAVE MONEY TO GET MORE.: NEVER TRUE

## 2025-05-15 ASSESSMENT — PATIENT HEALTH QUESTIONNAIRE - PHQ9
1. LITTLE INTEREST OR PLEASURE IN DOING THINGS: NOT AT ALL
SUM OF ALL RESPONSES TO PHQ QUESTIONS 1-9: 0
SUM OF ALL RESPONSES TO PHQ QUESTIONS 1-9: 0
2. FEELING DOWN, DEPRESSED OR HOPELESS: NOT AT ALL
SUM OF ALL RESPONSES TO PHQ QUESTIONS 1-9: 0
SUM OF ALL RESPONSES TO PHQ QUESTIONS 1-9: 0

## 2025-05-15 ASSESSMENT — ENCOUNTER SYMPTOMS: RESPIRATORY NEGATIVE: 1

## 2025-05-15 NOTE — PROGRESS NOTES
FOLLOW UP VISIT    Subjective:    Smitha Quiroz (: 2000) is a 24 y.o., female,   Chief Complaint   Patient presents with    Medication Refill    Anxiety       HPI:  Medication Refill    Anxiety            History of Present Illness  The patient presents for a follow-up of their anxiety, celiac disease, and medication management.    Anxiety  - They are currently on Lexapro for anxiety management, which they report as being effective.  - They express a desire to maintain the current dosage of this medication and are seeking a refill of their Lexapro prescription.    Medication Refills  - They are also requesting a refill of their EpiPen.    Thyroid Issues  - They are on levothyroxine for their thyroid issues and are under the care of an endocrinologist, Dr. Valencia, whom they will be seeing shortly.    Celiac Disease  - They had a follow-up endoscopy for celiac disease, during which duodenal ulcers were found.  - They are now taking omeprazole.  - A follow-up appointment is scheduled with their doctor, who does not plan to do another endoscopy but wants to see them in the office.    Supplemental information: They are moving to [place], [place], and want to ensure they have medication refills and their records transferred. Dr. Walker manages their birth control. They are also taking vitamin D.    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